# Patient Record
Sex: FEMALE | Race: BLACK OR AFRICAN AMERICAN | Employment: OTHER | URBAN - METROPOLITAN AREA
[De-identification: names, ages, dates, MRNs, and addresses within clinical notes are randomized per-mention and may not be internally consistent; named-entity substitution may affect disease eponyms.]

---

## 2018-09-19 ENCOUNTER — HOSPITAL ENCOUNTER (INPATIENT)
Dept: INPATIENT UNIT | Age: 78
LOS: 3 days | Discharge: NURSING, REHAB OR LONG-TERM CARE | DRG: 091 | End: 2018-09-22
Attending: EMERGENCY MEDICINE | Admitting: FAMILY MEDICINE
Payer: COMMERCIAL

## 2018-09-19 DIAGNOSIS — R47.89 WORD FINDING DIFFICULTY: Primary | ICD-10-CM

## 2018-09-19 DIAGNOSIS — Z99.2 ESRD ON HEMODIALYSIS (HCC): ICD-10-CM

## 2018-09-19 DIAGNOSIS — G93.40 ACUTE ENCEPHALOPATHY: ICD-10-CM

## 2018-09-19 DIAGNOSIS — N18.6 ESRD ON HEMODIALYSIS (HCC): ICD-10-CM

## 2018-09-19 DIAGNOSIS — J81.0 ACUTE PULMONARY EDEMA (HCC): ICD-10-CM

## 2018-09-19 DIAGNOSIS — I10 HYPERTENSION, UNSPECIFIED TYPE: ICD-10-CM

## 2018-09-19 PROBLEM — R47.01 APHASIA: Status: ACTIVE | Noted: 2018-09-19

## 2018-09-19 LAB
A/G RATIO: 1 (ref 1.1–2.2)
ABO/RH: NORMAL
ALBUMIN SERPL-MCNC: 3.6 G/DL (ref 3.4–5)
ALP BLD-CCNC: 105 U/L (ref 40–129)
ALT SERPL-CCNC: 17 U/L (ref 10–40)
ANION GAP SERPL CALCULATED.3IONS-SCNC: 14 MMOL/L (ref 3–16)
ANTIBODY SCREEN: NORMAL
APTT: 27.6 SEC (ref 26–36)
AST SERPL-CCNC: 24 U/L (ref 15–37)
BASOPHILS ABSOLUTE: 0 K/UL (ref 0–0.2)
BASOPHILS RELATIVE PERCENT: 0.4 %
BILIRUB SERPL-MCNC: 0.7 MG/DL (ref 0–1)
BUN BLDV-MCNC: 31 MG/DL (ref 7–20)
CALCIUM SERPL-MCNC: 8.9 MG/DL (ref 8.3–10.6)
CHLORIDE BLD-SCNC: 91 MMOL/L (ref 99–110)
CO2: 32 MMOL/L (ref 21–32)
CREAT SERPL-MCNC: 3.7 MG/DL (ref 0.6–1.2)
EOSINOPHILS ABSOLUTE: 0.1 K/UL (ref 0–0.6)
EOSINOPHILS RELATIVE PERCENT: 1.1 %
GFR AFRICAN AMERICAN: 14
GFR NON-AFRICAN AMERICAN: 12
GLOBULIN: 3.7 G/DL
GLUCOSE BLD-MCNC: 110 MG/DL (ref 70–99)
GLUCOSE BLD-MCNC: 128 MG/DL (ref 70–99)
HCT VFR BLD CALC: 38.9 % (ref 36–48)
HEMOGLOBIN: 12.6 G/DL (ref 12–16)
INR BLD: 1.04 (ref 0.86–1.14)
LYMPHOCYTES ABSOLUTE: 1.7 K/UL (ref 1–5.1)
LYMPHOCYTES RELATIVE PERCENT: 23.9 %
MCH RBC QN AUTO: 26.6 PG (ref 26–34)
MCHC RBC AUTO-ENTMCNC: 32.4 G/DL (ref 31–36)
MCV RBC AUTO: 82.1 FL (ref 80–100)
MONOCYTES ABSOLUTE: 0.7 K/UL (ref 0–1.3)
MONOCYTES RELATIVE PERCENT: 10 %
NEUTROPHILS ABSOLUTE: 4.6 K/UL (ref 1.7–7.7)
NEUTROPHILS RELATIVE PERCENT: 64.6 %
PDW BLD-RTO: 16.8 % (ref 12.4–15.4)
PERFORMED ON: ABNORMAL
PLATELET # BLD: 186 K/UL (ref 135–450)
PMV BLD AUTO: 7.9 FL (ref 5–10.5)
POTASSIUM SERPL-SCNC: 4.3 MMOL/L (ref 3.5–5.1)
PROTHROMBIN TIME: 11.8 SEC (ref 9.8–13)
RBC # BLD: 4.74 M/UL (ref 4–5.2)
SODIUM BLD-SCNC: 137 MMOL/L (ref 136–145)
TOTAL PROTEIN: 7.3 G/DL (ref 6.4–8.2)
TROPONIN: 0.06 NG/ML
TROPONIN: 0.07 NG/ML
WBC # BLD: 7.2 K/UL (ref 4–11)

## 2018-09-19 PROCEDURE — 93005 ELECTROCARDIOGRAM TRACING: CPT

## 2018-09-19 PROCEDURE — 9990 CHARGE CONVERSION

## 2018-09-19 PROCEDURE — 85730 THROMBOPLASTIN TIME PARTIAL: CPT

## 2018-09-19 PROCEDURE — 94640 AIRWAY INHALATION TREATMENT: CPT

## 2018-09-19 PROCEDURE — 86900 BLOOD TYPING SEROLOGIC ABO: CPT

## 2018-09-19 PROCEDURE — 85025 COMPLETE CBC W/AUTO DIFF WBC: CPT

## 2018-09-19 PROCEDURE — 99285 EMERGENCY DEPT VISIT HI MDM: CPT

## 2018-09-19 PROCEDURE — 85610 PROTHROMBIN TIME: CPT

## 2018-09-19 PROCEDURE — 86901 BLOOD TYPING SEROLOGIC RH(D): CPT

## 2018-09-19 PROCEDURE — 80053 COMPREHEN METABOLIC PANEL: CPT

## 2018-09-19 PROCEDURE — 93010 ELECTROCARDIOGRAM REPORT: CPT | Performed by: INTERNAL MEDICINE

## 2018-09-19 PROCEDURE — 71045 X-RAY EXAM CHEST 1 VIEW: CPT

## 2018-09-19 PROCEDURE — 86850 RBC ANTIBODY SCREEN: CPT

## 2018-09-19 PROCEDURE — 36415 COLL VENOUS BLD VENIPUNCTURE: CPT

## 2018-09-19 PROCEDURE — 84484 ASSAY OF TROPONIN QUANT: CPT

## 2018-09-19 PROCEDURE — 94664 DEMO&/EVAL PT USE INHALER: CPT

## 2018-09-19 PROCEDURE — 96374 THER/PROPH/DIAG INJ IV PUSH: CPT

## 2018-09-19 PROCEDURE — 70450 CT HEAD/BRAIN W/O DYE: CPT

## 2018-09-19 RX ORDER — IPRATROPIUM BROMIDE AND ALBUTEROL SULFATE 2.5; .5 MG/3ML; MG/3ML
1 SOLUTION RESPIRATORY (INHALATION) 4 TIMES DAILY
Status: DISCONTINUED | OUTPATIENT
Start: 2018-09-20 | End: 2018-09-22 | Stop reason: HOSPADM

## 2018-09-19 RX ORDER — CHOLECALCIFEROL (VITAMIN D3) 10 MCG
1 TABLET ORAL
COMMUNITY

## 2018-09-19 RX ORDER — BACLOFEN 10 MG/1
10 TABLET ORAL 3 TIMES DAILY PRN
Status: DISCONTINUED | OUTPATIENT
Start: 2018-09-19 | End: 2018-09-22 | Stop reason: HOSPADM

## 2018-09-19 RX ORDER — PROMETHAZINE HYDROCHLORIDE 25 MG/1
12.5 TABLET ORAL EVERY 6 HOURS PRN
Status: DISCONTINUED | OUTPATIENT
Start: 2018-09-19 | End: 2018-09-22 | Stop reason: HOSPADM

## 2018-09-19 RX ORDER — ACETAMINOPHEN 325 MG/1
650 TABLET ORAL EVERY 6 HOURS PRN
Status: DISCONTINUED | OUTPATIENT
Start: 2018-09-19 | End: 2018-09-22 | Stop reason: HOSPADM

## 2018-09-19 RX ORDER — ASPIRIN 81 MG/1
81 TABLET ORAL DAILY
Status: DISCONTINUED | OUTPATIENT
Start: 2018-09-20 | End: 2018-09-22 | Stop reason: HOSPADM

## 2018-09-19 RX ORDER — IPRATROPIUM BROMIDE AND ALBUTEROL SULFATE 2.5; .5 MG/3ML; MG/3ML
1 SOLUTION RESPIRATORY (INHALATION) EVERY 4 HOURS PRN
Status: DISCONTINUED | OUTPATIENT
Start: 2018-09-19 | End: 2018-09-22 | Stop reason: HOSPADM

## 2018-09-19 RX ORDER — HYDRALAZINE HYDROCHLORIDE 20 MG/ML
5 INJECTION INTRAMUSCULAR; INTRAVENOUS EVERY 6 HOURS PRN
Status: DISCONTINUED | OUTPATIENT
Start: 2018-09-19 | End: 2018-09-22 | Stop reason: HOSPADM

## 2018-09-19 RX ORDER — OMEPRAZOLE 20 MG/1
20 CAPSULE, DELAYED RELEASE ORAL DAILY
COMMUNITY

## 2018-09-19 RX ORDER — CARVEDILOL 6.25 MG/1
6.25 TABLET ORAL 2 TIMES DAILY WITH MEALS
Status: DISCONTINUED | OUTPATIENT
Start: 2018-09-19 | End: 2018-09-22 | Stop reason: HOSPADM

## 2018-09-19 RX ORDER — TIZANIDINE 4 MG/1
4 TABLET ORAL EVERY 12 HOURS PRN
Status: ON HOLD | COMMUNITY
End: 2018-10-04 | Stop reason: HOSPADM

## 2018-09-19 RX ORDER — LORATADINE 10 MG/1
10 TABLET ORAL DAILY
Status: ON HOLD | COMMUNITY
End: 2018-10-04 | Stop reason: HOSPADM

## 2018-09-19 RX ORDER — DILTIAZEM HYDROCHLORIDE 60 MG/1
60 TABLET, FILM COATED ORAL SEE ADMIN INSTRUCTIONS
COMMUNITY
End: 2019-02-11 | Stop reason: ALTCHOICE

## 2018-09-19 RX ORDER — PANTOPRAZOLE SODIUM 40 MG/1
40 TABLET, DELAYED RELEASE ORAL
Status: DISCONTINUED | OUTPATIENT
Start: 2018-09-20 | End: 2018-09-22 | Stop reason: HOSPADM

## 2018-09-19 RX ORDER — HYDRALAZINE HYDROCHLORIDE 20 MG/ML
5 INJECTION INTRAMUSCULAR; INTRAVENOUS EVERY 4 HOURS PRN
Status: DISCONTINUED | OUTPATIENT
Start: 2018-09-19 | End: 2018-09-19

## 2018-09-19 RX ORDER — LOSARTAN POTASSIUM 50 MG/1
50 TABLET ORAL DAILY
Status: ON HOLD | COMMUNITY
End: 2018-09-22 | Stop reason: HOSPADM

## 2018-09-19 RX ORDER — IPRATROPIUM BROMIDE AND ALBUTEROL SULFATE 2.5; .5 MG/3ML; MG/3ML
1 SOLUTION RESPIRATORY (INHALATION) EVERY 4 HOURS
Status: DISCONTINUED | OUTPATIENT
Start: 2018-09-19 | End: 2018-09-19

## 2018-09-19 RX ORDER — MONTELUKAST SODIUM 10 MG/1
10 TABLET ORAL NIGHTLY
Status: DISCONTINUED | OUTPATIENT
Start: 2018-09-19 | End: 2018-09-22 | Stop reason: HOSPADM

## 2018-09-19 RX ORDER — SODIUM CHLORIDE 0.9 % (FLUSH) 0.9 %
10 SYRINGE (ML) INJECTION EVERY 12 HOURS SCHEDULED
Status: DISCONTINUED | OUTPATIENT
Start: 2018-09-19 | End: 2018-09-22 | Stop reason: HOSPADM

## 2018-09-19 RX ORDER — MIDODRINE HYDROCHLORIDE 5 MG/1
10 TABLET ORAL
Status: DISCONTINUED | OUTPATIENT
Start: 2018-09-21 | End: 2018-09-21

## 2018-09-19 RX ORDER — CARVEDILOL 6.25 MG/1
6.25 TABLET ORAL 2 TIMES DAILY WITH MEALS
COMMUNITY

## 2018-09-19 RX ORDER — ATORVASTATIN CALCIUM 20 MG/1
20 TABLET, FILM COATED ORAL
COMMUNITY

## 2018-09-19 RX ORDER — ONDANSETRON 2 MG/ML
4 INJECTION INTRAMUSCULAR; INTRAVENOUS EVERY 6 HOURS PRN
Status: DISCONTINUED | OUTPATIENT
Start: 2018-09-19 | End: 2018-09-22 | Stop reason: HOSPADM

## 2018-09-19 RX ORDER — FLUTICASONE PROPIONATE 50 MCG
2 SPRAY, SUSPENSION (ML) NASAL DAILY
COMMUNITY

## 2018-09-19 RX ORDER — SODIUM CHLORIDE 0.9 % (FLUSH) 0.9 %
10 SYRINGE (ML) INJECTION PRN
Status: DISCONTINUED | OUTPATIENT
Start: 2018-09-19 | End: 2018-09-22 | Stop reason: HOSPADM

## 2018-09-19 RX ORDER — LEVETIRACETAM 500 MG/1
500 TABLET ORAL 2 TIMES DAILY
Status: DISCONTINUED | OUTPATIENT
Start: 2018-09-19 | End: 2018-09-22 | Stop reason: HOSPADM

## 2018-09-19 RX ORDER — ATORVASTATIN CALCIUM 20 MG/1
20 TABLET, FILM COATED ORAL
Status: DISCONTINUED | OUTPATIENT
Start: 2018-09-19 | End: 2018-09-22 | Stop reason: HOSPADM

## 2018-09-19 RX ORDER — DILTIAZEM HYDROCHLORIDE 60 MG/1
60 TABLET, FILM COATED ORAL
Status: DISCONTINUED | OUTPATIENT
Start: 2018-09-21 | End: 2018-09-22 | Stop reason: HOSPADM

## 2018-09-19 RX ORDER — BENZONATATE 100 MG/1
100 CAPSULE ORAL EVERY 4 HOURS PRN
Status: ON HOLD | COMMUNITY
End: 2018-10-04 | Stop reason: HOSPADM

## 2018-09-19 RX ORDER — ASPIRIN 81 MG/1
81 TABLET ORAL DAILY
Status: DISCONTINUED | OUTPATIENT
Start: 2018-09-19 | End: 2018-09-19

## 2018-09-19 RX ORDER — BACLOFEN 10 MG/1
10 TABLET ORAL 3 TIMES DAILY PRN
Status: ON HOLD | COMMUNITY
End: 2018-10-04 | Stop reason: HOSPADM

## 2018-09-19 RX ADMIN — LEVETIRACETAM 500 MG: 500 TABLET ORAL at 20:37

## 2018-09-19 RX ADMIN — Medication 10 ML: at 20:38

## 2018-09-19 RX ADMIN — CARVEDILOL 6.25 MG: 6.25 TABLET, FILM COATED ORAL at 20:37

## 2018-09-19 RX ADMIN — ATORVASTATIN CALCIUM 20 MG: 20 TABLET, FILM COATED ORAL at 20:38

## 2018-09-19 RX ADMIN — IPRATROPIUM BROMIDE AND ALBUTEROL SULFATE 3 ML: 2.5; .5 SOLUTION RESPIRATORY (INHALATION) at 20:24

## 2018-09-19 RX ADMIN — MONTELUKAST SODIUM 10 MG: 10 TABLET ORAL at 20:37

## 2018-09-19 RX ADMIN — HYDRALAZINE HYDROCHLORIDE 5 MG: 20 INJECTION INTRAMUSCULAR; INTRAVENOUS at 17:26

## 2018-09-19 ASSESSMENT — ENCOUNTER SYMPTOMS
STRIDOR: 0
NAUSEA: 0
SHORTNESS OF BREATH: 0
BACK PAIN: 0
CONSTIPATION: 0
ABDOMINAL DISTENTION: 0
ABDOMINAL PAIN: 0
COLOR CHANGE: 0
DIARRHEA: 0
WHEEZING: 0
VOMITING: 0
COUGH: 0

## 2018-09-19 ASSESSMENT — PAIN SCALES - GENERAL
PAINLEVEL_OUTOF10: 0

## 2018-09-19 NOTE — ED PROVIDER NOTES
Because of this the patient would benefit from further evaluation but she is not a lytic candidate. Critical impression 1 dysarthria 2.   CVA     Liza Louise MD  09/19/18 4305

## 2018-09-19 NOTE — ED NOTES
Pharmacy Medication History Note      List of current medications patient is taking is complete. Source of information: MAR form Home at [facility], called nurse for last dose times. Changes made to medication list:  Medications flagged for removal (include reason, ex. noncompliance):  · N/A    Medications removed (include reason, ex. therapy complete or physician discontinued):  · Humalog- dose adjustment  · Methocarbamol- therapy complete    Medications added/doses adjusted:  · Aspirin 81mg- QD  · Baclofen 10- TID prn  · Carvedilol 6.25mg- BID  · Losartan 50mg- QD  · Diltiazem 60mg- BID three days a week  · Loratadine 10mg- QD  · Midodrine 10mg- three times a week  · Novolog- sliding scale    Other notes (ex. Recent course of antibiotics, Coumadin dosing):  · Denies use of other OTC or herbal medications. Last dose times updated.    Samia

## 2018-09-19 NOTE — ED PROVIDER NOTES
Palsy (4. ): Normal  Motor Arm, Left (5a. ): No drift  Motor Arm, Right (5b. ): No drift  Motor Leg, Left (6a. ): No drift  Motor Leg, Right (6b. ): No drift  Limb Ataxia (7. ): Absent  Sensory (8. ): Normal  Best Language (9. ): No aphasia  Dysarthria (10. ): Normal  Extinction and Inattention (11): No neglect  Total: 0         PHYSICAL EXAM    (up to 7 for level 4, 8 or more for level 5)     ED Triage Vitals   BP Temp Temp Source Pulse Resp SpO2 Height Weight   09/19/18 1239 09/19/18 1234 09/19/18 1234 09/19/18 1234 09/19/18 1234 09/19/18 1234 -- 09/19/18 1234   (!) 179/63 97 °F (36.1 °C) Oral 78 17 97 %  10 lb (4.536 kg)       Physical Exam   Constitutional: She is oriented to person, place, and time. She appears well-developed and well-nourished. No distress. HENT:   Head: Normocephalic and atraumatic. Right Ear: External ear normal.   Left Ear: External ear normal.   Nose: Nose normal.   Mouth/Throat: Oropharynx is clear and moist.   Eyes: Pupils are equal, round, and reactive to light. Conjunctivae and EOM are normal. Right eye exhibits no discharge. Left eye exhibits no discharge. No scleral icterus. Neck: Normal range of motion. No JVD present. No Brudzinski's sign and no Kernig's sign noted. Cardiovascular: Normal rate. Pulmonary/Chest: Effort normal and breath sounds normal. No stridor. Abdominal: Soft. Bowel sounds are normal. She exhibits no abdominal bruit and no pulsatile midline mass. There is no tenderness. There is no CVA tenderness. Musculoskeletal: Normal range of motion. Lymphadenopathy:     She has no cervical adenopathy. Neurological: She is alert and oriented to person, place, and time. She has normal strength. No cranial nerve deficit (II-XII Intact) or sensory deficit. GCS eye subscore is 4. GCS verbal subscore is 5. GCS motor subscore is 6.    Mild word finding difficulty  5 out of 5 strength in all 4 extremities without focal weakness, paresthesia or radiculopathy   No Laboratory  [address], [address]   Phone [phone]   TYPE AND SCREEN       All other labs were within normal range or not returned as of this dictation. EKG: All EKG's are interpreted by the Emergency Department Physician who either signs or Co-signs this chart in the absence of a cardiologist.  Please see their note for interpretation of EKG. RADIOLOGY:   Non-plain film images such as CT, Ultrasound and MRI are read by the radiologist. Plain radiographic images are visualized and preliminarily interpreted by the  ED Provider with the below findings:        Interpretation per the Radiologist below, if available at the time of this note:    XR CHEST PORTABLE   Final Result   Mild CHF with asymmetric right-sided edema and/or pneumonia. CT Head WO Contrast   Final Result   No acute intracranial abnormality. PROCEDURES   Unless otherwise noted below, none     Procedures    CRITICAL CARE TIME   N/A    CONSULTS:  Tomasa Gallo HOSPITALIST  Dr Lg Henry will admit     EMERGENCY DEPARTMENT COURSE and DIFFERENTIAL DIAGNOSIS/MDM:   Vitals:    Vitals:    09/19/18 1239 09/19/18 1245 09/19/18 1330 09/19/18 1345   BP: (!) 179/63 (!) 188/65 (!) 182/86 (!) 186/75   Pulse:  71 79 79   Resp:  18 17 16   Temp:       TempSrc:       SpO2:  97% 95% 94%   Weight:           Patient was given the following medications:  Medications - No data to display    This patient presents to the emergency department with some word finding difficulty and confusion. She has no chest pain or shortness of breath. It is unclear what is causing her acute encephalopathy. We did consider potential stroke however no other neuro deficits are appreciated on my exam.  She told my attending that she started being a little confused last night.   My suspicion is low for ICH, epidural or subdural hematoma, subarachnoid hemorrhage,ACS, PE, myocarditis, pericarditis, pneumothorax,   thoracic aortic dissection,

## 2018-09-20 LAB
CHOLESTEROL, TOTAL: 163 MG/DL (ref 0–199)
HDLC SERPL-MCNC: 49 MG/DL (ref 40–60)
LDL CHOLESTEROL CALCULATED: 93 MG/DL
LEFT VENTRICULAR EJECTION FRACTION HIGH VALUE: 50 %
LEFT VENTRICULAR EJECTION FRACTION MODE: NORMAL
LV EF: 45 %
LV EF: 48 %
LVEF MODALITY: NORMAL
TRIGL SERPL-MCNC: 106 MG/DL (ref 0–150)
TROPONIN: 0.07 NG/ML
VLDLC SERPL CALC-MCNC: 21 MG/DL

## 2018-09-20 PROCEDURE — G8979 MOBILITY GOAL STATUS: HCPCS

## 2018-09-20 PROCEDURE — 92523 SPEECH SOUND LANG COMPREHEN: CPT

## 2018-09-20 PROCEDURE — G8997 SWALLOW GOAL STATUS: HCPCS

## 2018-09-20 PROCEDURE — 70551 MRI BRAIN STEM W/O DYE: CPT

## 2018-09-20 PROCEDURE — 92610 EVALUATE SWALLOWING FUNCTION: CPT

## 2018-09-20 PROCEDURE — 97162 PT EVAL MOD COMPLEX 30 MIN: CPT

## 2018-09-20 PROCEDURE — 80061 LIPID PANEL: CPT

## 2018-09-20 PROCEDURE — G8996 SWALLOW CURRENT STATUS: HCPCS

## 2018-09-20 PROCEDURE — 93306 TTE W/DOPPLER COMPLETE: CPT

## 2018-09-20 PROCEDURE — G8987 SELF CARE CURRENT STATUS: HCPCS

## 2018-09-20 PROCEDURE — 36415 COLL VENOUS BLD VENIPUNCTURE: CPT

## 2018-09-20 PROCEDURE — 99223 1ST HOSP IP/OBS HIGH 75: CPT | Performed by: PSYCHIATRY & NEUROLOGY

## 2018-09-20 PROCEDURE — 84484 ASSAY OF TROPONIN QUANT: CPT

## 2018-09-20 PROCEDURE — 9990 CHARGE CONVERSION

## 2018-09-20 PROCEDURE — G8989 SELF CARE D/C STATUS: HCPCS

## 2018-09-20 PROCEDURE — 97530 THERAPEUTIC ACTIVITIES: CPT

## 2018-09-20 PROCEDURE — 97165 OT EVAL LOW COMPLEX 30 MIN: CPT

## 2018-09-20 PROCEDURE — 97116 GAIT TRAINING THERAPY: CPT

## 2018-09-20 PROCEDURE — 94640 AIRWAY INHALATION TREATMENT: CPT

## 2018-09-20 PROCEDURE — 94760 N-INVAS EAR/PLS OXIMETRY 1: CPT

## 2018-09-20 PROCEDURE — G8988 SELF CARE GOAL STATUS: HCPCS

## 2018-09-20 PROCEDURE — G8978 MOBILITY CURRENT STATUS: HCPCS

## 2018-09-20 PROCEDURE — 92526 ORAL FUNCTION THERAPY: CPT

## 2018-09-20 RX ADMIN — CARVEDILOL 6.25 MG: 6.25 TABLET, FILM COATED ORAL at 08:52

## 2018-09-20 RX ADMIN — IPRATROPIUM BROMIDE AND ALBUTEROL SULFATE 3 ML: .5; 3 SOLUTION RESPIRATORY (INHALATION) at 20:41

## 2018-09-20 RX ADMIN — MONTELUKAST SODIUM 10 MG: 10 TABLET ORAL at 21:50

## 2018-09-20 RX ADMIN — ASPIRIN 81 MG: 81 TABLET, COATED ORAL at 08:52

## 2018-09-20 RX ADMIN — Medication 10 ML: at 08:56

## 2018-09-20 RX ADMIN — CARVEDILOL 6.25 MG: 6.25 TABLET, FILM COATED ORAL at 18:37

## 2018-09-20 RX ADMIN — LEVETIRACETAM 500 MG: 500 TABLET ORAL at 21:50

## 2018-09-20 RX ADMIN — PANTOPRAZOLE SODIUM 40 MG: 40 TABLET, DELAYED RELEASE ORAL at 08:52

## 2018-09-20 RX ADMIN — IPRATROPIUM BROMIDE AND ALBUTEROL SULFATE 3 ML: .5; 3 SOLUTION RESPIRATORY (INHALATION) at 08:15

## 2018-09-20 RX ADMIN — LEVETIRACETAM 500 MG: 500 TABLET ORAL at 08:52

## 2018-09-20 RX ADMIN — IPRATROPIUM BROMIDE AND ALBUTEROL SULFATE 3 ML: .5; 3 SOLUTION RESPIRATORY (INHALATION) at 15:58

## 2018-09-20 RX ADMIN — Medication 10 ML: at 21:50

## 2018-09-20 RX ADMIN — IPRATROPIUM BROMIDE AND ALBUTEROL SULFATE 3 ML: .5; 3 SOLUTION RESPIRATORY (INHALATION) at 12:16

## 2018-09-20 ASSESSMENT — PAIN SCALES - GENERAL
PAINLEVEL_OUTOF10: 0
PAINLEVEL_OUTOF10: 0
PAINLEVEL_OUTOF10: 1
PAINLEVEL_OUTOF10: 1
PAINLEVEL_OUTOF10: 0

## 2018-09-20 NOTE — CONSULTS
NEUROLOGY CONSULTATION     Patient's Name :   Kelsi Juarez        YOB: 1940                    Date of Consultation : 9/20/2018 4:17 PM    Referring Physician :  Alycai Whaley MD    Reason for Consultation :    ?  Stroke versus TIA    HISTORY OF PRESENT ILLNESS :    Pilo Guzman is a 66 y.o. female   History was obtained from patient and from dictations in the chart. Patient has end-stage renal disease on hemodialysis. She states that she was late for her dialysis which is very unusual and then became somewhat confused. She states that she also could not talk very well and could not express herself very well. She was brought to the hospital and admitted for further evaluation symptoms of slowly improved. Patient states that she is almost back to normal now but not completely. She still feels that she has some difficulty with speech at times especially some word finding difficulties. Denies any focal weakness. Has chronic numbness in her arms and legs from neuropathy.   Symptom onset was fairly acute and abrupt and moderately severe without any other aggravating or relieving factors      REVIEW OF SYSTEMS  Denies any chest pain palpitations breathlessness abdominal pain fever or weight loss  Rest of the 14 system review was reviewed and was negative except for the symptoms noted above    Past Medical History:   Diagnosis Date    [facility]fib ([facility] [address]     Asthma     DJD (degenerative joint disease) of knee 6/16/2010    DM (diabetes mellitus) ([facility] [address] 6/15/2010    ESRD (end stage renal disease) ([facility] [address]     HD Mon/Wed/Fri    Hemodialysis patient ([facility] [address]     HTN (hypertension) 6/15/2010    Hyperlipidemia     Pneumonia     Seizure disorder (HCC)     Type II or unspecified type diabetes mellitus without mention of complication, not stated as uncontrolled      Family History   Problem Relation Age of Onset    Diabetes Other     Hypertension Other     Coronary Art Dis Other     Stroke Other     Breast Cancer Other      Social History     Social History    Marital status:       Spouse name: N/A    Number of children: 4    Years of education: 15     Social History Main Topics    Smoking status: Never Smoker    Smokeless tobacco: Never Used    Alcohol use No    Drug use: No    Sexual activity: No     Other Topics Concern    None     Social History Narrative    None     Current Facility-Administered Medications   Medication Dose Route Frequency Provider Last Rate Last Dose    aspirin EC tablet 81 mg  81 mg Oral Daily Jere Ormond, MD   81 mg at 09/20/18 0852    atorvastatin (LIPITOR) tablet 20 mg  20 mg Oral Once per day on Mon Wed Fri Jere Ormond, MD   20 mg at 09/19/18 2038    acetaminophen (TYLENOL) tablet 650 mg  650 mg Oral Q6H PRN Jere Ormond, MD        baclofen (LIORESAL) tablet 10 mg  10 mg Oral TID PRN Jere Ormond, MD        carvedilol (COREG) tablet 6.25 mg  6.25 mg Oral BID  Jere Ormond, MD   6.25 mg at 09/20/18 0852    [START ON 9/21/2018] diltiazem (CARDIZEM) tablet 60 mg  60 mg Oral 2 times per day on Mon Wed Fri Jere Ormond, MD        levETIRAcetam (KEPPRA) tablet 500 mg  500 mg Oral BID Jere Ormond, MD   500 mg at 09/20/18 0852    [START ON 9/21/2018] midodrine (PROAMATINE) tablet 10 mg  10 mg Oral Once per day on Mon Wed Fri Jere Ormond, MD        montelukast (SINGULAIR) tablet 10 mg  10 mg Oral Nightly Jere Ormond, MD   10 mg at 09/19/18 2037    pantoprazole (PROTONIX) tablet 40 mg  40 mg Oral QAM AC Jere Ormond, MD   40 mg at 09/20/18 0852    promethazine (PHENERGAN) tablet 12.5 mg  12.5 mg Oral Q6H PRN Jere Ormond, MD        sodium chloride flush 0.9 % injection 10 mL  10 mL Intravenous 2 times per day Jere Ormond, MD   10 mL at 09/20/18 0856    sodium chloride flush 0.9 % injection 10 mL  10 mL Intravenous PRN Jere Ormond, MD        magnesium hydroxide (MILK OF MAGNESIA) 400 Keppra  Continue aspirin  I will get carotid Doppler study  Thank you for this consultation        Please note a portion of this chart was generated using dragon dictation software. Although every effort was made to ensure the accuracy of this automated transcription, some errors in transcription may have occurred.

## 2018-09-20 NOTE — PROGRESS NOTES
intubated patients on mechanical ventilation. 6. Inhalation medication orders will be delivered and/or substituted as outlined below. Aerosolized Medications Ordering and Administration Guidelines:    1. All Medications will be ordered by a physician, and their frequency and/or modality will be adjusted as defined by the patients Respiratory Severity Index (RSI) score. 2. If the patient does not have documented COPD, consider discontinuing anticholinergics when RSI is less than 9.  3. If the bronchospasm worsens (increased RSI), then the bronchodilator frequency can be increased to a maximum of every 4 hours. If greater than every 4 hours is required, the physician will be contacted. 4. If the bronchospasm improves, the frequency of the bronchodilator can be decreased, based on the patient's RSI, but not less than home treatment regimen frequency. 5. Bronchodilator(s) will be discontinued if patient has a RSI less than 9 and has received no scheduled or as needed treatment for 72  Hrs. Patients Ordered on a Mucolytic Agent:    1. Must always be administered with a bronchodilator. 2. Discontinue if patient experiences worsened bronchospasm, or secretions have lessened to the point that the patient is able to clear them with a cough. Anti-inflammatory and Combination Medications:    1. If the patient lacks prior history of lung disease, is not using inhaled anti-inflammatory medication at home, and lacks wheezing by examination or by history for at least 24 hours, contact physician for possible discontinuation.

## 2018-09-20 NOTE — CARE COORDINATION
Discharge planning-    Patient is noted to be from Home at [facility]. Called Home at [facility], left message with Raudel Rascon in admissions. Requested return call re: to confirm which level of care the patient is from (long-term care vs short-term rehab). May need a pre cert to return (PT/OT pending). Facesheet sent via fax. Addendum: Spoke with [address] at [facility]. Patient is from long-term care. Patient would need a pre cert only if there is a skilled need- usually, patient does not return to the SNF with any skilled needs. Will await PT/OT evals. Plan- From Home at [facility] SNF long-term care. Will need pre cert to return only if there is a skilled need.     Will continue to follow for support and discharge planning.    -Chinmay Sheridan, MSW, LSW

## 2018-09-20 NOTE — PROGRESS NOTES
Occupational Therapy   Occupational Therapy Initial Assessment/Discharge Summary  Date: 2018   Patient Name: Tiara Price  MRN: [id number]     : 1940    Date of Service: 2018    Discharge Recommendations: Tiara Price scored a 23/24 on the [facility] ADL Inpatient form. At this time, no further OT is recommended upon discharge due to pt functioning at or near baseline level for daily occupations. Recommend patient returns to prior setting with prior services (supervision from aide for showers, assist for medications and all other IADLs from [facility] and family). OT Equipment Recommendations  Equipment Needed: No      Patient Diagnosis(es): The primary encounter diagnosis was Word finding difficulty. Diagnoses of Acute encephalopathy, Hypertension, unspecified type, ESRD on hemodialysis ([facility] [address] and Acute pulmonary edema ([facility] [address] were also pertinent to this visit. has a past medical history of A-fib ([address] Asthma; DJD (degenerative joint disease) of knee; DM (diabetes mellitus) ([address] ESRD (end stage renal disease) ([address] Hemodialysis patient ([address] HTN (hypertension); Hyperlipidemia; Pneumonia; Seizure disorder ([address] and Type II or unspecified type diabetes mellitus without mention of complication, not stated as uncontrolled. has a past surgical history that includes Cholecystectomy; Knee Arthroplasty; Hysterectomy; and Cataract removal with implant (Left, ). Restrictions  Restrictions/Precautions  Restrictions/Precautions: Fall Risk (HIGH)  Required Braces or Orthoses?: No  Position Activity Restriction  Other position/activity restrictions: Tiraa Price is a 66 y.o. female with history of atrial fibrillation, diabetes, end-stage renal disease on dialysis , hypertension, seizure disorder who presents to the emergency departments with confusion. She states that she has felt confused ever since she woke up this morning.   She states that last night before bed, she felt fine. She is having some word finding difficulties but otherwise denies any visual disturbance, dizziness, lightheadedness, headache, chest pain, shortness of breath, abdominal pain, nausea, vomiting. She was at dialysis and became very hypertensive and more confused and so they sent her into the ED for further evaluation. Subjective   General  Chart Reviewed: Yes  Patient assessed for rehabilitation services?: Yes  Family / Caregiver Present: Yes (son)  Diagnosis: aphasia  Subjective  Subjective: Pt seated in recliner upon arrival, agreeable to evaluation. Pain Assessment  Patient Currently in Pain: Denies  Pain Assessment: 0-10       Social/Functional History  Social/Functional History  Type of Home: Facility ([facility])  Home Layout: One level  Bathroom Shower/Tub: Walk-in shower  Bathroom Toilet: Standard  Bathroom Equipment:  (pull cords in bathroom)  Bathroom Accessibility: Wheelchair accessible  Home Equipment: Wheelchair-manual, 4 wheeled walker, Oxygen (\"oxygen if I need it, I have only used it one time\")  ADL Assistance: Needs assistance  Bath:  (aide assists)  Dressing: Independent  Toileting: Independent (mod I - with rollator or w/c)  Ambulation Assistance: Independent (reports using w/c without leg rests and pulling with legs for main mode of mobility; also uses rollator with supervision for short trips.)  Transfer Assistance: Independent (reports using arm rests for transfers)  Leisure & Hobbies: gardening, cooking, reading  IADL Comments: Nurses help with medications. Meals are brought to her room daily. Family helps with bills. 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 Reports having been at [facility] for 3-4 years. Reports hx of 2 falls in the past few years; but none in the past 6 months.        Objective   Vision: Impaired  Hearing: Within functional limits    Orientation  Overall

## 2018-09-20 NOTE — PROGRESS NOTES
1-2x to ensure diet tolerance   ST.) Pt will tolerate recommended diet without s/s of aspiration     SPEECH LANGUAGE EVALUATION:  Speech Language Treatment Diagnosis:  Minimal Expressive Aphasia     Speech Language Impressions: Pt was awake and alert. Oral motor exam was [facility] with no dysarthria assessed. Verbal expression was characterized by intermittent impaired/delayed word finding and reduced thought organization. Auditory comprehension was mildly delayed for complex commands. Pt was oriented to all aspects. Long term memory appeared intact. Oral Motor exam/Motor Speech:  -No dysarthria      Verbal Expression:   -Confrontational naming with 95% accuracy   -Picture description with 85% accuracy   -Convergent naming with 75% accuracy, min cues   -Divergent naming with 25% accuracy   -Able to participate in conversation with intermittent impaired word finding (i.e., pt was unable to recall name of place she lives)    Auditory Comprehension:   -Appeared grossly WFL for questions/commands and at the conversational level    -Delayed processing for complex commands     Cognitive-Linguistic:   -Oriented to all aspects   -Unable to recall current living location   -Long term memory appeared intact   -Problem solving appeared intact     Plan: Speech therapy 3-5 times a weeks for speech-language treatment, and dysphagia treatment     Discharge Recommendations:  Pt will benefit from continued skilled Speech Therapy for Speech and Dysphagia services, prior to returning home. Prior Status: Resides at [facility]. Prior hx of CVA     Speech Language Short-term goals: 1. Pt will improve verbal expression via graded tasks to 100% min cues    2. Pt will improve short term recall via graded tasks to 80%    Patient/Family Education:Education, results and recommendations given to the Pt and nurse, who verbalized understanding    SLP G-Codes  Functional Limitations: Swallowing  Swallow Current Status ():  At least 20 percent but less than 40 percent impaired, limited or restricted  Swallow Goal Status ():  At least 1 percent but less than 20 percent impaired, limited or restricted     Timed Code Treatment:  0 minutes     Total Treatment Time: 39 minutes      Kelly Alas [address]  Speech Language Pathologist

## 2018-09-20 NOTE — PLAN OF CARE
Problem: HEMODYNAMIC STATUS  Goal: Patient has stable vital signs and fluid balance  Outcome: Ongoing  Keep  or less. Pulse rate and rhythm, peripheral pulses, and capillary refill assessed every shift with assessment. General color and body temperature monitored throughout shift and with vitals. Assess for edema with head to toe assessment. Administer treatments and medications as ordered. Monitor patient's weight. Problem: ACTIVITY INTOLERANCE/IMPAIRED MOBILITY  Goal: Mobility/activity is maintained at optimum level for patient  Outcome: Ongoing  No deficits noted at this time. Monitor Q4 and PRN. Problem: COMMUNICATION IMPAIRMENT  Goal: Ability to express needs and understand communication  Outcome: Ongoing  Pt exhibiting expressive aphasia and echolalia. Monitor Q4 and PRN.

## 2018-09-20 NOTE — PLAN OF CARE
Problem: Falls - Risk of:  Goal: Will remain free from falls  Will remain free from falls   Outcome: Ongoing  Pt remains free of falls. Fall risk protocol in place. Bed locked in lowest position. Call light in reach. Pt instructed to call for assistance, verbalizes understanding. Will continue to monitor.   Goal: Absence of physical injury  Absence of physical injury   Outcome: Ongoing

## 2018-09-20 NOTE — PROGRESS NOTES
dizziness, lightheadedness, headache, chest pain, shortness of breath, abdominal pain, nausea, vomiting. She was at dialysis and became very hypertensive and more confused and so they sent her into the ED for further evaluation. Vision/Hearing  Vision: Impaired  Vision Exceptions:  (reports recent change in vision with onset of confusion/difficulty speaking yesterday)  Hearing: Within functional limits     Subjective  General  Chart Reviewed: Yes  Response To Previous Treatment: Not applicable  Family / Caregiver Present: Yes (son)  Follows Commands: Within Functional Limits  Subjective  Subjective: Pt seated in bedside chair upon arrival with son present. Reports that she feels healthy but has been having trouble finding words and remembering things since her dialysis treatment yesterday. Pt did demonstrate some signs of aphasia, but was able to communicate well at this date. Pt agreeable to PT/OT evaluation.    Pain Screening  Patient Currently in Pain: Denies  Vital Signs  Patient Currently in Pain: Denies       Orientation  Orientation  Overall Orientation Status: Within Functional Limits    Social/Functional History  Social/Functional History  Type of Home: Facility ([facility])  Home Layout: One level  Bathroom Shower/Tub: Walk-in shower  Bathroom Toilet: Standard  Bathroom Equipment:  (pull cords in bathroom)  Bathroom Accessibility: Wheelchair accessible  Home Equipment: Wheelchair-manual, 4 wheeled walker, Oxygen (\"oxygen if I need it, I have only used it one time\")  ADL Assistance: Needs assistance  Bath:  (aide assists)  Dressing: Independent  Toileting: Independent (mod I - with rollator or w/c)  Ambulation Assistance: Independent (reports using w/c without leg rests and pulling with legs for main mode of mobility; also uses rollator with SBA/supervision for short trips.)  Transfer Assistance: Independent (reports using arm rests for transfers)  Leisure & Hobbies: gardening, cooking, reading  IADL functional mobility and strength concurrent with baseline level of function. Pt able to complete transfers and ambulation with rollator and SBA; pt receives supervision for mobility at [facility] at baseline. Pt does not require skilled PT services at this time. Prognosis: Excellent  Decision Making: Low Complexity  Patient Education: PT role in acute care, POC, discharge recommendations; amb with nursing staff as desired  No Skilled PT: At baseline function  REQUIRES PT FOLLOW UP: No  Activity Tolerance  Activity Tolerance: Patient Tolerated treatment well  Activity Tolerance: Pt amb with no SOB or dizziness         Plan   Plan  Plan Comment: Pt discharged from PT services due to functional mobility concurrent with baseline PLOF. Safety Devices  Type of devices: All fall risk precautions in place, Call light within reach, Chair alarm in place, Gait belt, Patient at risk for falls, Left in chair, Nurse notified  Restraints  Initially in place: No    G-Code  PT G-Codes  Functional Assessment Tool Used: AM-PAC  Score: 21  Functional Limitation: Mobility: Walking and moving around  Mobility: Walking and Moving Around Current Status (): At least 20 percent but less than 40 percent impaired, limited or restricted  Mobility: Walking and Moving Around Goal Status (): At least 1 percent but less than 20 percent impaired, limited or restricted  OutComes Score    AM-PAC Score  AM-PAC Inpatient Mobility Raw Score : 21  AM-PAC Inpatient T-Scale Score : 50.25  Mobility Inpatient CMS 0-100% Score: 28.97  Mobility Inpatient CMS G-Code Modifier : CJ          Goals   Pt d/c from PT services. Therapy Time   Individual Concurrent Group Co-treatment   Time In 1120         Time Out 1200         Minutes 40         Timed Code Treatment Minutes: [address], SPT       Therapist observed and directed the above evaluation.  Thanks, Greer ClementBasil, [address] [facility], DPT [id number]

## 2018-09-20 NOTE — PLAN OF CARE
Problem: Cardiac Output - Decreased:  Goal: Hemodynamic stability will improve  Hemodynamic stability will improve   Outcome: Ongoing  Pulse rate and rhythm, peripheral pulses, and capillary refill assessed every shift with assessment. General color and body temperature monitored throughout shift and with vitals. Assess for edema with head to toe assessment. Administer treatments and medications as ordered. Monitor patient's weight. Problem: Serum Glucose Level - Abnormal:  Goal: Ability to maintain appropriate glucose levels will improve  Ability to maintain appropriate glucose levels will improve   Outcome: Ongoing      Problem:  Activity:  Goal: Fatigue will decrease  Fatigue will decrease  Outcome: Ongoing    Goal: Risk for activity intolerance will decrease  Risk for activity intolerance will decrease  Outcome: Ongoing      Problem: Coping:  Goal: Ability to cope will improve  Ability to cope will improve  Outcome: Ongoing      Problem: Fluid Volume:  Goal: Will show no signs or symptoms of fluid imbalance  Will show no signs or symptoms of fluid imbalance  Outcome: Ongoing      Problem: Health Behavior:  Goal: Ability to manage health-related needs will improve  Ability to manage health-related needs will improve  Outcome: Ongoing    Goal: Identification of resources available to assist in meeting health care needs will improve  Identification of resources available to assist in meeting health care needs will improve  Outcome: Ongoing      Problem: Nutritional:  Goal: Ability to identify appropriate dietary choices will improve  Ability to identify appropriate dietary choices will improve  Outcome: Ongoing      Problem: Physical Regulation:  Goal: Ability to maintain clinical measurements within normal limits will improve  Ability to maintain clinical measurements within normal limits will improve  Outcome: Ongoing    Goal: Complications related to the disease process, condition or treatment will be avoided

## 2018-09-21 PROBLEM — N25.0 ROD (RENAL OSTEODYSTROPHY): Chronic | Status: ACTIVE | Noted: 2018-09-21

## 2018-09-21 LAB
ALBUMIN SERPL-MCNC: 3.5 G/DL (ref 3.4–5)
ANION GAP SERPL CALCULATED.3IONS-SCNC: 19 MMOL/L (ref 3–16)
BUN BLDV-MCNC: 66 MG/DL (ref 7–20)
CALCIUM SERPL-MCNC: 8.6 MG/DL (ref 8.3–10.6)
CHLORIDE BLD-SCNC: 92 MMOL/L (ref 99–110)
CO2: 28 MMOL/L (ref 21–32)
CREAT SERPL-MCNC: 7.6 MG/DL (ref 0.6–1.2)
GFR AFRICAN AMERICAN: 6
GFR NON-AFRICAN AMERICAN: 5
GLUCOSE BLD-MCNC: 134 MG/DL (ref 70–99)
HCT VFR BLD CALC: 34.7 % (ref 36–48)
HEMOGLOBIN: 11.3 G/DL (ref 12–16)
HEPATITIS B SURFACE ANTIGEN INTERPRETATION: NORMAL
MCH RBC QN AUTO: 26.2 PG (ref 26–34)
MCHC RBC AUTO-ENTMCNC: 32.6 G/DL (ref 31–36)
MCV RBC AUTO: 80.3 FL (ref 80–100)
PDW BLD-RTO: 16.7 % (ref 12.4–15.4)
PHOSPHORUS: 5.5 MG/DL (ref 2.5–4.9)
PLATELET # BLD: 198 K/UL (ref 135–450)
PMV BLD AUTO: 7.9 FL (ref 5–10.5)
POTASSIUM SERPL-SCNC: 4.2 MMOL/L (ref 3.5–5.1)
RBC # BLD: 4.32 M/UL (ref 4–5.2)
SODIUM BLD-SCNC: 139 MMOL/L (ref 136–145)
WBC # BLD: 5.6 K/UL (ref 4–11)

## 2018-09-21 PROCEDURE — 93880 EXTRACRANIAL BILAT STUDY: CPT

## 2018-09-21 PROCEDURE — 94640 AIRWAY INHALATION TREATMENT: CPT

## 2018-09-21 PROCEDURE — 85027 COMPLETE CBC AUTOMATED: CPT

## 2018-09-21 PROCEDURE — 80069 RENAL FUNCTION PANEL: CPT

## 2018-09-21 PROCEDURE — 99233 SBSQ HOSP IP/OBS HIGH 50: CPT | Performed by: PSYCHIATRY & NEUROLOGY

## 2018-09-21 PROCEDURE — 87340 HEPATITIS B SURFACE AG IA: CPT

## 2018-09-21 PROCEDURE — 5A1D70Z PERFORMANCE OF URINARY FILTRATION, INTERMITTENT, LESS THAN 6 HOURS PER DAY: ICD-10-PCS | Performed by: INTERNAL MEDICINE

## 2018-09-21 PROCEDURE — 86704 HEP B CORE ANTIBODY TOTAL: CPT

## 2018-09-21 PROCEDURE — 9990 CHARGE CONVERSION

## 2018-09-21 PROCEDURE — 94760 N-INVAS EAR/PLS OXIMETRY 1: CPT

## 2018-09-21 RX ORDER — LOSARTAN POTASSIUM 100 MG/1
100 TABLET ORAL NIGHTLY
Status: DISCONTINUED | OUTPATIENT
Start: 2018-09-22 | End: 2018-09-22 | Stop reason: HOSPADM

## 2018-09-21 RX ORDER — LOSARTAN POTASSIUM 25 MG/1
50 TABLET ORAL ONCE
Status: COMPLETED | OUTPATIENT
Start: 2018-09-21 | End: 2018-09-21

## 2018-09-21 RX ORDER — LOSARTAN POTASSIUM 25 MG/1
50 TABLET ORAL DAILY
Status: DISCONTINUED | OUTPATIENT
Start: 2018-09-21 | End: 2018-09-21

## 2018-09-21 RX ADMIN — CARVEDILOL 6.25 MG: 6.25 TABLET, FILM COATED ORAL at 15:17

## 2018-09-21 RX ADMIN — Medication 10 ML: at 21:31

## 2018-09-21 RX ADMIN — LOSARTAN POTASSIUM 50 MG: 25 TABLET ORAL at 15:17

## 2018-09-21 RX ADMIN — MONTELUKAST SODIUM 10 MG: 10 TABLET ORAL at 21:30

## 2018-09-21 RX ADMIN — Medication 10 ML: at 09:32

## 2018-09-21 RX ADMIN — LOSARTAN POTASSIUM 50 MG: 25 TABLET ORAL at 21:30

## 2018-09-21 RX ADMIN — IPRATROPIUM BROMIDE AND ALBUTEROL SULFATE 3 ML: .5; 3 SOLUTION RESPIRATORY (INHALATION) at 21:09

## 2018-09-21 RX ADMIN — PANTOPRAZOLE SODIUM 40 MG: 40 TABLET, DELAYED RELEASE ORAL at 19:32

## 2018-09-21 RX ADMIN — LEVETIRACETAM 500 MG: 500 TABLET ORAL at 21:30

## 2018-09-21 RX ADMIN — ASPIRIN 81 MG: 81 TABLET, COATED ORAL at 19:32

## 2018-09-21 RX ADMIN — IPRATROPIUM BROMIDE AND ALBUTEROL SULFATE 3 ML: .5; 3 SOLUTION RESPIRATORY (INHALATION) at 16:55

## 2018-09-21 RX ADMIN — Medication 0.2 ML: at 09:50

## 2018-09-21 RX ADMIN — ATORVASTATIN CALCIUM 20 MG: 20 TABLET, FILM COATED ORAL at 21:30

## 2018-09-21 ASSESSMENT — PAIN SCALES - GENERAL
PAINLEVEL_OUTOF10: 0
PAINLEVEL_OUTOF10: 10
PAINLEVEL_OUTOF10: 0

## 2018-09-21 NOTE — PROGRESS NOTES
nebulizer solution 3 mL 4x daily   ipratropium-albuterol (DUONEB) nebulizer solution 1 ampule Q4H PRN       Objective:  BP (!) 156/66   Pulse 84   Temp 97.8 °F (36.6 °C) (Temporal)   Resp 16   Ht 4' 11.84\" (1.52 m)   Wt 154 lb 15.7 oz (70.3 kg)   SpO2 100%   BMI 30.43 kg/m²   No intake or output data in the 24 hours ending 09/21/18 0733 Wt Readings from Last 3 Encounters:   09/21/18 154 lb 15.7 oz (70.3 kg)   02/12/18 140 lb (63.5 kg)   01/11/17 151 lb 3.8 oz (68.6 kg)       General appearance:   female,Appears comfortable  Eyes: Sclera clear. Pupils equal.  ENT: Moist oral mucosa. Trachea midline, no adenopathy. Cardiovascular: Regular rhythm, normal S1, S2. No murmur. Respiratory: Not using accessory muscles. Good inspiratory effort. Clear to auscultation bilaterally, no wheeze or crackles. GI: Abdomen soft, no tenderness, not distended, normal bowel sounds  Musculoskeletal: No cyanosis in digits, neck supple  Neurology: CN 2-12 grossly intact. No speech or motor deficits  Psych: Normal affect. Alert and oriented in time, place and person  Skin: Warm, dry, normal turgor  Extremities:No edema in lower extremities, left forearm graft intact.     Labs and Tests:  CBC:   Recent Labs      09/19/18   1250   WBC  7.2   HGB  12.6   PLT  186     BMP:  Recent Labs      09/19/18   1250   NA  137   K  4.3   CL  91*   CO2  32   BUN  31*   CREATININE  3.7*   GLUCOSE  128*     Hepatic: Recent Labs      09/19/18   1250   AST  24   ALT  17   BILITOT  0.7   ALKPHOS  105         Problem List  Active Problems:    Aphasia    Word finding difficulty  Resolved Problems:    * No resolved hospital problems. *       Assessment & Plan:   1.r/o CVA ,  CT of the head is negative for acute findings,  MRI of the brain did not show any new stroke, remote old stroke there   2. Speech and language therapy,  PT/OT consulted. Neurology note reiewed  3. ESRD, Nephrology for Hemodialysis orders . HDdone today   4.  Remote

## 2018-09-21 NOTE — PLAN OF CARE
Problem: Falls - Risk of:  Goal: Will remain free from falls  Will remain free from falls   Outcome: Ongoing  Pt remains free of falls. Fall risk protocol in place. Bed locked in lowest position. Call light in reach. Pt instructed to call for assistance, verbalizes understanding. Will continue to monitor. Goal: Absence of physical injury  Absence of physical injury   Outcome: Ongoing      Problem: HEMODYNAMIC STATUS  Goal: Patient has stable vital signs and fluid balance  Outcome: Completed Date Met: 09/20/18      Problem: ACTIVITY INTOLERANCE/IMPAIRED MOBILITY  Goal: Mobility/activity is maintained at optimum level for patient  Outcome: Completed Date Met: 09/20/18      Problem: COMMUNICATION IMPAIRMENT  Goal: Ability to express needs and understand communication  Outcome: Completed Date Met: 09/20/18      Problem: Cardiac Output - Decreased:  Goal: Hemodynamic stability will improve  Hemodynamic stability will improve   Outcome: Ongoing  Pulse rate and rhythm, peripheral pulses, and capillary refill assessed every shift with assessment. General color and body temperature monitored throughout shift and with vitals. Assess for edema with head to toe assessment. Administer treatments and medications as ordered. Monitor patient's weight.       Problem: Serum Glucose Level - Abnormal:  Goal: Ability to maintain appropriate glucose levels will improve  Ability to maintain appropriate glucose levels will improve   Outcome: Ongoing

## 2018-09-21 NOTE — PROGRESS NOTES
Speech Language Pathology  Attempt   Ann Marie Lamar   1940     Attempted to see pt for dysphagia/speech therapy follow-up. Pt off floor at time of attempt. Will re-attempt as therapy schedule allows.     Thanks,  Tony Grove, [facility]  Speech Language Pathologist

## 2018-09-21 NOTE — PROGRESS NOTES
[facility] PROGRESS NOTE    9/21/2018 10:11 PM        Name: Zoë Núñez . Admitted: 9/19/2018  Primary Care Provider: Referring Not In System (Inactive) (Tel: None)                        Hospital course:  Kylie Dunne a 66 y. o. female who is a nursing home resident has a medical h/o ESRD ( HD on MWF), AFib , DM  Was sent from the Dialysis center d/t acute confusion memory lapse, and intermittent difficulty with word finding . Son at the bedside states the pt is other wise very sharp and remembers all her appointments etc/ Her BP has been high since yesterday. She also c/o new onset headache. She has chronic numbness and tingling in glove and sock fashion    Subjective:  . No acute events overnight. Resting well. Pain control. Diet ok. Labs reviewed  Denies any chest pain sob.      Reviewed interval ancillary notes    Current Medications    lidocaine 1 % injection 0.2 mL PRN   [START ON 9/22/2018] losartan (COZAAR) tablet 100 mg Nightly   aspirin EC tablet 81 mg Daily   atorvastatin (LIPITOR) tablet 20 mg Once per day on Mon Wed Fri   acetaminophen (TYLENOL) tablet 650 mg Q6H PRN   baclofen (LIORESAL) tablet 10 mg TID PRN   carvedilol (COREG) tablet 6.25 mg BID WC   diltiazem (CARDIZEM) tablet 60 mg 2 times per day on Mon Wed Fri   levETIRAcetam (KEPPRA) tablet 500 mg BID   montelukast (SINGULAIR) tablet 10 mg Nightly   pantoprazole (PROTONIX) tablet 40 mg QAM AC   promethazine (PHENERGAN) tablet 12.5 mg Q6H PRN   sodium chloride flush 0.9 % injection 10 mL 2 times per day   sodium chloride flush 0.9 % injection 10 mL PRN   magnesium hydroxide (MILK OF MAGNESIA) 400 MG/5ML suspension 30 mL Daily PRN   ondansetron (ZOFRAN) injection 4 mg Q6H PRN   mometasone-formoterol (DULERA) 200-5 MCG/ACT inhaler 2 puff BID   hydrALAZINE (APRESOLINE) injection 5 mg Q6H PRN hypertensive  Resolved Problems:    * No resolved hospital problems. *       Assessment & Plan:     1.r/o CVA ,  CT of the head is negative for acute findings,  MRI of the brain result normal  2. Speech and language therapy,  PT/OT consulted. Neurology following  3. ESRD, Nephrology for Hemodialysis orders  For today's session   4. Remote history of CVA cont asa and lipitor  5. HTN holding antihypertensives  6. Chronic elevated troponin at baseline 2/2 ESRD pt denies chest pain , will continue to monitor  7.  Afib c/w coreg and cardizem  8.most likely home tomorrow.             Diet: DIET GENERAL;  Code:Full Code  DVT PPX lovenox       Mango Washington MD   9/21/2018 10:11 PM

## 2018-09-22 VITALS
RESPIRATION RATE: 16 BRPM | BODY MASS INDEX: 30.78 KG/M2 | WEIGHT: 156.8 LBS | HEART RATE: 89 BPM | HEIGHT: 60 IN | SYSTOLIC BLOOD PRESSURE: 154 MMHG | OXYGEN SATURATION: 96 % | TEMPERATURE: 97.1 F | DIASTOLIC BLOOD PRESSURE: 76 MMHG

## 2018-09-22 LAB
GLUCOSE BLD-MCNC: 112 MG/DL (ref 70–99)
GLUCOSE BLD-MCNC: 241 MG/DL (ref 70–99)
HEPATITIS B CORE TOTAL ANTIBODY: NEGATIVE
PERFORMED ON: ABNORMAL
PERFORMED ON: ABNORMAL

## 2018-09-22 PROCEDURE — 6370000000 HC RX 637 (ALT 250 FOR IP): Performed by: FAMILY MEDICINE

## 2018-09-22 PROCEDURE — 94640 AIRWAY INHALATION TREATMENT: CPT

## 2018-09-22 PROCEDURE — 94760 N-INVAS EAR/PLS OXIMETRY 1: CPT

## 2018-09-22 PROCEDURE — 2580000003 HC RX 258: Performed by: FAMILY MEDICINE

## 2018-09-22 RX ORDER — LOSARTAN POTASSIUM 100 MG/1
100 TABLET ORAL NIGHTLY
Qty: 30 TABLET | Refills: 3 | Status: SHIPPED | OUTPATIENT
Start: 2018-09-22

## 2018-09-22 RX ADMIN — Medication 10 ML: at 10:42

## 2018-09-22 RX ADMIN — CARVEDILOL 6.25 MG: 6.25 TABLET, FILM COATED ORAL at 10:41

## 2018-09-22 RX ADMIN — ASPIRIN 81 MG: 81 TABLET, COATED ORAL at 10:42

## 2018-09-22 RX ADMIN — PANTOPRAZOLE SODIUM 40 MG: 40 TABLET, DELAYED RELEASE ORAL at 06:30

## 2018-09-22 RX ADMIN — LEVETIRACETAM 500 MG: 500 TABLET ORAL at 10:42

## 2018-09-22 RX ADMIN — IPRATROPIUM BROMIDE AND ALBUTEROL SULFATE 3 ML: .5; 3 SOLUTION RESPIRATORY (INHALATION) at 08:04

## 2018-09-22 ASSESSMENT — PAIN SCALES - GENERAL
PAINLEVEL_OUTOF10: 0
PAINLEVEL_OUTOF10: 0

## 2018-09-22 NOTE — DISCHARGE SUMMARY
Wednesday FridayHistorical Med      vitamin D (CHOLECALCIFEROL) 5000 UNITS CAPS capsule Take 5,000 Units by mouth daily      promethazine (PHENERGAN) 12.5 MG tablet Take 12.5 mg by mouth every 6 hours as needed for Nausea      calcium carbonate (TUMS) 500 MG chewable tablet Take 2 tablets by mouth 3 times daily Historical Med      montelukast (SINGULAIR) 10 MG tablet Take 10 mg by mouth nightly. fluticasone-salmeterol (ADVAIR DISKUS) 250-50 MCG/DOSE AEPB Inhale 1 puff into the lungs every 12 hours. ipratropium-albuterol (DUONEB) 0.5-2.5 (3) MG/3ML SOLN nebulizer solution Inhale 1 vial into the lungs every 4 hours. acetaminophen (TYLENOL) 325 MG tablet Take 650 mg by mouth every 6 hours as needed for Pain.      levetiracetam (KEPPRA) 500 MG tablet TAKE 1 TABLET BY MOUTH TWICE DAILY, Disp-180 tablet, R-2**Patient requests 90 day supply**      NOVOFINE 32G X 6 MM MISC Disp-100 each, R-0, Normal      diphenoxylate-atropine (LOMOTIL) 2.5-0.025 MG per tablet TAKE 1 TABLET BY MOUTH FOUR TIMES DAILY AS NEEDED FOR DIARRHEA, Disp-60 tablet, R-5           Discharge Medication List as of 9/22/2018 11:48 AM          Discharge ROS:  A complete review of systems was asked and negative    Discharge Exam:    /76   Pulse 83   Temp 96.6 °F (35.9 °C) (Temporal)   Resp 16   Ht 4' 11.84\" (1.52 m)   Wt 156 lb 12.8 oz (71.1 kg)   SpO2 91%   BMI 30.78 kg/m²   General appearance:  NAD  HEENT:   Normal cephalic, atraumatic, moist mucous membranes, no oropharyngeal erythema or exudate  Neck: Supple, trachea midline, no anterior cervical or SC LAD  Heart[de-identified] Normal S1/S2, no S3 or S4 RRR, no murmurs or rubs. Lungs:  Clear to auscultation bilaterally, No wheeze, rales or rhonchi noted.   Abdomen: Soft, non-tender, non-distended,no organomegaly noted,  bowel sounds present, no masses  Musculoskeletal: Grossly intact,muscle strength equal and moves all four extremities 5/5 strength  Extremities: No clubbing, no Measurements +---------------+----+----+-----+----+ ! Location       ! PSV ! EDV ! Angle! RI  ! +---------------+----+----+-----+----+ ! Prox CCA       !40.8!5.35!60   !0.87! +---------------+----+----+-----+----+ ! Mid CCA        !45. 9!6.75!60   !0.85! +---------------+----+----+-----+----+ ! Dist CCA       !38. 6!6.19! 60   !0.84! +---------------+----+----+-----+----+ ! Prox ICA       !67.8!11. 8!60   !0.83! +---------------+----+----+-----+----+ ! Mid ICA        !33. 9!5.41!0    !0.84! +---------------+----+----+-----+----+ ! Prox ECA       !65.4! ! 46   !    ! +---------------+----+----+-----+----+ ! Vertebral      !46.4!    !60   !    ! +---------------+----+----+-----+----+ ! Prox Subclavian!68.3! ! 46   !    ! +---------------+----+----+-----+----+   - There is antegrade vertebral flow noted on the right side. - Additional Measurements:ICAPSV/CCAPSV 1.48. ICAEDV/CCAEDV 2.21. Carotid Left Measurements +---------------+----+----+-----+----+ ! Location       ! PSV ! EDV ! Angle! RI  ! +---------------+----+----+-----+----+ ! Prox CCA       !42. 5!8.16!60   !0.81! +---------------+----+----+-----+----+ ! Mid CCA        !40.5!7.88!60   !0.81! +---------------+----+----+-----+----+ ! Dist CCA       !39. 5!4.64!36   !0.86! +---------------+----+----+-----+----+ ! Prox ICA       ! 49  !11. 3!60   !0.77! +---------------+----+----+-----+----+ ! Mid ICA        !46. 7!85.7!67   !0.77! +---------------+----+----+-----+----+ ! Dist ICA       !70.8!17. 6! 60   !0.75! +---------------+----+----+-----+----+ ! Prox ECA       !63.1!    !60   !    ! +---------------+----+----+-----+----+ ! Vertebral      !36.1!    !60   !    ! +---------------+----+----+-----+----+ ! Prox Subclavian! 181 !    !60   !    ! +---------------+----+----+-----+----+   - There is antegrade vertebral flow noted on the left side. - Additional Measurements:ICAPSV/CCAPSV 1.75. ICAEDV/CCAEDV 2.16.     Mri Brain Without Contrast    Result Date: 9/20/2018  EXAMINATION: MRI OF THE BRAIN infarct in the left occipital lobe. 3. Chronic lacunar infarcts in the left periventricular white matter, and left putamen. 4. Moderate chronic microvascular white matter ischemic disease is noted supratentorially. 5. Stable opacification of the posterior left ethmoid air cells and left sphenoid sinus. 6.  There is a partially empty sella. Correlate with clinical signs/symptoms of endocrine dysfunction as well as serum laboratory values. The patient was seen and examined on day of discharge and this discharge summary is in conjunction with any daily progress note from day of discharge. Time Spent on discharge is 45 minutes  in the examination, evaluation, counseling and review of medications and discharge plan. Signed:    Dominga Silva MD   9/22/2018      Thank you Referring Not In System (Inactive) for the opportunity to be involved in this patient's care.  If you have any questions or concerns please feel free to contact me

## 2018-09-22 NOTE — PROGRESS NOTES
Nephrology Progress Note        [phone]        [email or web address]      Subjective:  -S/p HD yesterday (UF 700mL)  -No acute events overnight  -BPs at goal today  -No labs checked today      Review of Systems: No active or new complaints  Social History: No family at bedside      Vitals:  BP (!) 154/76   Pulse 89   Temp 97.1 °F (36.2 °C) (Temporal)   Resp 16   Ht 4' 11.84\" (1.52 m)   Wt 156 lb 12.8 oz (71.1 kg)   SpO2 96%   BMI 30.78 kg/m²   I/O last 3 completed shifts: In: 130 [P.O.:120; I.V.:10]  Out: 75 [Urine:75]  I/O this shift:  In: 240 [P.O.:240]  Out: -     Physical Exam:  Gen: NAD  HEENT: Sclera anicteric, MMM  Neck: Supple. No JVD. (+) Scar  CV: RRR, S1/S2, 2/6SM  Lungs: CTAB/L  Abd: Soft, NT, ND, (+)BS  Ext: No edema in B/L LE  Neuro: Awake/Alert, Answers questions appropriately  Skin: Warm.  No visible rashes appreciated  Access: LUE AVG w/ good thrill/bruit      Labs:  CBC with Differential:    Lab Results   Component Value Date    WBC 5.6 09/21/2018    RBC 4.32 09/21/2018    HGB 11.3 09/21/2018    HCT 34.7 09/21/2018     09/21/2018    MCV 80.3 09/21/2018    MCH 26.2 09/21/2018    MCHC 32.6 09/21/2018    RDW 16.7 09/21/2018    NRBC CANCELED 04/30/2012    NRBC CANCELED 04/30/2012    SEGSPCT 70.4 09/25/2012    BANDSPCT 1 11/07/2014    BLASTSPCT CANCELED 04/30/2012    METASPCT CANCELED 04/30/2012    LYMPHOPCT 23.9 09/19/2018    PROMYELOPCT CANCELED 04/30/2012    MONOPCT 10.0 09/19/2018    MYELOPCT CANCELED 04/30/2012    EOSPCT 2.7 04/30/2012    BASOPCT 0.4 09/19/2018    MONOSABS 0.7 09/19/2018    LYMPHSABS 1.7 09/19/2018    EOSABS 0.1 09/19/2018    BASOSABS 0.0 09/19/2018    DIFFTYPE Auto-K 09/25/2012     BMP:    Lab Results   Component Value Date     09/21/2018    K 4.2 09/21/2018    CL 92 09/21/2018    CO2 28 09/21/2018    BUN 66 09/21/2018    LABALBU 3.5 09/21/2018    CREATININE 7.6 09/21/2018    CALCIUM 8.6 09/21/2018    GFRAA 6 09/21/2018    GFRAA 27 12/17/2012    LABGLOM 5

## 2018-09-24 LAB
EKG ATRIAL RATE: 70 BPM
EKG DIAGNOSIS: NORMAL
EKG P AXIS: 66 DEGREES
EKG P-R INTERVAL: 140 MS
EKG Q-T INTERVAL: 448 MS
EKG QRS DURATION: 74 MS
EKG QTC CALCULATION (BAZETT): 483 MS
EKG R AXIS: 38 DEGREES
EKG T AXIS: 59 DEGREES
EKG VENTRICULAR RATE: 70 BPM

## 2018-10-03 ENCOUNTER — APPOINTMENT (OUTPATIENT)
Dept: MRI IMAGING | Age: 78
DRG: 070 | End: 2018-10-03
Payer: COMMERCIAL

## 2018-10-03 ENCOUNTER — HOSPITAL ENCOUNTER (INPATIENT)
Age: 78
LOS: 1 days | Discharge: NURSING, REHAB OR LONG-TERM CARE | DRG: 070 | End: 2018-10-04
Attending: EMERGENCY MEDICINE | Admitting: INTERNAL MEDICINE
Payer: COMMERCIAL

## 2018-10-03 ENCOUNTER — APPOINTMENT (OUTPATIENT)
Dept: GENERAL RADIOLOGY | Age: 78
DRG: 070 | End: 2018-10-03
Payer: COMMERCIAL

## 2018-10-03 ENCOUNTER — APPOINTMENT (OUTPATIENT)
Dept: CT IMAGING | Age: 78
DRG: 070 | End: 2018-10-03
Payer: COMMERCIAL

## 2018-10-03 DIAGNOSIS — R41.82 ALTERED MENTAL STATUS, UNSPECIFIED ALTERED MENTAL STATUS TYPE: Primary | ICD-10-CM

## 2018-10-03 PROBLEM — G93.41 ACUTE METABOLIC ENCEPHALOPATHY: Status: ACTIVE | Noted: 2018-10-03

## 2018-10-03 LAB
A/G RATIO: 1 (ref 1.1–2.2)
ALBUMIN SERPL-MCNC: 3.7 G/DL (ref 3.4–5)
ALP BLD-CCNC: 112 U/L (ref 40–129)
ALT SERPL-CCNC: 11 U/L (ref 10–40)
AMMONIA: 15 UMOL/L (ref 11–51)
ANION GAP SERPL CALCULATED.3IONS-SCNC: 20 MMOL/L (ref 3–16)
AST SERPL-CCNC: 16 U/L (ref 15–37)
BILIRUB SERPL-MCNC: 0.3 MG/DL (ref 0–1)
BUN BLDV-MCNC: 62 MG/DL (ref 7–20)
CALCIUM SERPL-MCNC: 9.9 MG/DL (ref 8.3–10.6)
CHLORIDE BLD-SCNC: 94 MMOL/L (ref 99–110)
CO2: 29 MMOL/L (ref 21–32)
CREAT SERPL-MCNC: 7.8 MG/DL (ref 0.6–1.2)
EKG ATRIAL RATE: 87 BPM
EKG DIAGNOSIS: NORMAL
EKG P AXIS: 70 DEGREES
EKG P-R INTERVAL: 160 MS
EKG Q-T INTERVAL: 400 MS
EKG QRS DURATION: 76 MS
EKG QTC CALCULATION (BAZETT): 481 MS
EKG R AXIS: -20 DEGREES
EKG T AXIS: 20 DEGREES
EKG VENTRICULAR RATE: 87 BPM
GFR AFRICAN AMERICAN: 6
GFR NON-AFRICAN AMERICAN: 5
GLOBULIN: 3.7 G/DL
GLUCOSE BLD-MCNC: 118 MG/DL (ref 70–99)
GLUCOSE BLD-MCNC: 150 MG/DL (ref 70–99)
HCT VFR BLD CALC: 37 % (ref 36–48)
HEMOGLOBIN: 12.1 G/DL (ref 12–16)
INR BLD: 1.04 (ref 0.86–1.14)
KEPPRA DOSE AMT: NORMAL
KEPPRA: 44.8 UG/ML (ref 6–46)
LIPASE: 66 U/L (ref 13–60)
MCH RBC QN AUTO: 26.6 PG (ref 26–34)
MCHC RBC AUTO-ENTMCNC: 32.6 G/DL (ref 31–36)
MCV RBC AUTO: 81.8 FL (ref 80–100)
PDW BLD-RTO: 17.2 % (ref 12.4–15.4)
PERFORMED ON: ABNORMAL
PLATELET # BLD: 226 K/UL (ref 135–450)
PMV BLD AUTO: 7.6 FL (ref 5–10.5)
POTASSIUM SERPL-SCNC: 4.8 MMOL/L (ref 3.5–5.1)
PROTHROMBIN TIME: 11.9 SEC (ref 9.8–13)
RBC # BLD: 4.53 M/UL (ref 4–5.2)
SODIUM BLD-SCNC: 143 MMOL/L (ref 136–145)
TOTAL PROTEIN: 7.4 G/DL (ref 6.4–8.2)
TROPONIN: 0.08 NG/ML
WBC # BLD: 7.2 K/UL (ref 4–11)

## 2018-10-03 PROCEDURE — 71046 X-RAY EXAM CHEST 2 VIEWS: CPT

## 2018-10-03 PROCEDURE — 94640 AIRWAY INHALATION TREATMENT: CPT

## 2018-10-03 PROCEDURE — 94664 DEMO&/EVAL PT USE INHALER: CPT

## 2018-10-03 PROCEDURE — 80177 DRUG SCRN QUAN LEVETIRACETAM: CPT

## 2018-10-03 PROCEDURE — 96375 TX/PRO/DX INJ NEW DRUG ADDON: CPT

## 2018-10-03 PROCEDURE — 6360000004 HC RX CONTRAST MEDICATION: Performed by: EMERGENCY MEDICINE

## 2018-10-03 PROCEDURE — 6360000002 HC RX W HCPCS: Performed by: INTERNAL MEDICINE

## 2018-10-03 PROCEDURE — G8997 SWALLOW GOAL STATUS: HCPCS

## 2018-10-03 PROCEDURE — 5A1D70Z PERFORMANCE OF URINARY FILTRATION, INTERMITTENT, LESS THAN 6 HOURS PER DAY: ICD-10-PCS | Performed by: INTERNAL MEDICINE

## 2018-10-03 PROCEDURE — 70498 CT ANGIOGRAPHY NECK: CPT

## 2018-10-03 PROCEDURE — 6370000000 HC RX 637 (ALT 250 FOR IP): Performed by: INTERNAL MEDICINE

## 2018-10-03 PROCEDURE — G8996 SWALLOW CURRENT STATUS: HCPCS

## 2018-10-03 PROCEDURE — 96374 THER/PROPH/DIAG INJ IV PUSH: CPT

## 2018-10-03 PROCEDURE — 99285 EMERGENCY DEPT VISIT HI MDM: CPT

## 2018-10-03 PROCEDURE — G0378 HOSPITAL OBSERVATION PER HR: HCPCS

## 2018-10-03 PROCEDURE — 94762 N-INVAS EAR/PLS OXIMTRY CONT: CPT

## 2018-10-03 PROCEDURE — 92610 EVALUATE SWALLOWING FUNCTION: CPT

## 2018-10-03 PROCEDURE — 70450 CT HEAD/BRAIN W/O DYE: CPT

## 2018-10-03 PROCEDURE — 36415 COLL VENOUS BLD VENIPUNCTURE: CPT

## 2018-10-03 PROCEDURE — 70496 CT ANGIOGRAPHY HEAD: CPT

## 2018-10-03 PROCEDURE — 80053 COMPREHEN METABOLIC PANEL: CPT

## 2018-10-03 PROCEDURE — 93010 ELECTROCARDIOGRAM REPORT: CPT | Performed by: INTERNAL MEDICINE

## 2018-10-03 PROCEDURE — 82140 ASSAY OF AMMONIA: CPT

## 2018-10-03 PROCEDURE — 87040 BLOOD CULTURE FOR BACTERIA: CPT

## 2018-10-03 PROCEDURE — 2060000000 HC ICU INTERMEDIATE R&B

## 2018-10-03 PROCEDURE — 83690 ASSAY OF LIPASE: CPT

## 2018-10-03 PROCEDURE — 2580000003 HC RX 258: Performed by: INTERNAL MEDICINE

## 2018-10-03 PROCEDURE — 70551 MRI BRAIN STEM W/O DYE: CPT

## 2018-10-03 PROCEDURE — 85027 COMPLETE CBC AUTOMATED: CPT

## 2018-10-03 PROCEDURE — 93005 ELECTROCARDIOGRAM TRACING: CPT | Performed by: EMERGENCY MEDICINE

## 2018-10-03 PROCEDURE — 85610 PROTHROMBIN TIME: CPT

## 2018-10-03 PROCEDURE — 84484 ASSAY OF TROPONIN QUANT: CPT

## 2018-10-03 RX ORDER — SODIUM CHLORIDE 0.9 % (FLUSH) 0.9 %
10 SYRINGE (ML) INJECTION PRN
Status: DISCONTINUED | OUTPATIENT
Start: 2018-10-03 | End: 2018-10-04 | Stop reason: HOSPADM

## 2018-10-03 RX ORDER — LEVETIRACETAM 5 MG/ML
500 INJECTION INTRAVASCULAR EVERY 12 HOURS
Status: DISCONTINUED | OUTPATIENT
Start: 2018-10-03 | End: 2018-10-04

## 2018-10-03 RX ORDER — SODIUM CHLORIDE 0.9 % (FLUSH) 0.9 %
10 SYRINGE (ML) INJECTION EVERY 12 HOURS SCHEDULED
Status: DISCONTINUED | OUTPATIENT
Start: 2018-10-03 | End: 2018-10-04 | Stop reason: HOSPADM

## 2018-10-03 RX ORDER — HEPARIN SODIUM 5000 [USP'U]/ML
5000 INJECTION, SOLUTION INTRAVENOUS; SUBCUTANEOUS EVERY 8 HOURS SCHEDULED
Status: DISCONTINUED | OUTPATIENT
Start: 2018-10-03 | End: 2018-10-04

## 2018-10-03 RX ORDER — IPRATROPIUM BROMIDE AND ALBUTEROL SULFATE 2.5; .5 MG/3ML; MG/3ML
1 SOLUTION RESPIRATORY (INHALATION) EVERY 4 HOURS PRN
Status: DISCONTINUED | OUTPATIENT
Start: 2018-10-03 | End: 2018-10-04 | Stop reason: HOSPADM

## 2018-10-03 RX ORDER — LEVETIRACETAM 500 MG/1
1 TABLET ORAL 2 TIMES DAILY
Status: DISCONTINUED | OUTPATIENT
Start: 2018-10-03 | End: 2018-10-03

## 2018-10-03 RX ORDER — ATORVASTATIN CALCIUM 10 MG/1
20 TABLET, FILM COATED ORAL
Status: DISCONTINUED | OUTPATIENT
Start: 2018-10-03 | End: 2018-10-04 | Stop reason: HOSPADM

## 2018-10-03 RX ORDER — ONDANSETRON 2 MG/ML
4 INJECTION INTRAMUSCULAR; INTRAVENOUS EVERY 6 HOURS PRN
Status: DISCONTINUED | OUTPATIENT
Start: 2018-10-03 | End: 2018-10-04 | Stop reason: HOSPADM

## 2018-10-03 RX ORDER — CARVEDILOL 6.25 MG/1
6.25 TABLET ORAL 2 TIMES DAILY WITH MEALS
Status: DISCONTINUED | OUTPATIENT
Start: 2018-10-03 | End: 2018-10-04

## 2018-10-03 RX ORDER — HYDRALAZINE HYDROCHLORIDE 20 MG/ML
5 INJECTION INTRAMUSCULAR; INTRAVENOUS EVERY 6 HOURS PRN
Status: DISCONTINUED | OUTPATIENT
Start: 2018-10-03 | End: 2018-10-04

## 2018-10-03 RX ORDER — MONTELUKAST SODIUM 10 MG/1
10 TABLET ORAL DAILY
Status: DISCONTINUED | OUTPATIENT
Start: 2018-10-03 | End: 2018-10-04 | Stop reason: HOSPADM

## 2018-10-03 RX ORDER — ACETAMINOPHEN 500 MG
500 TABLET ORAL EVERY 8 HOURS SCHEDULED
Status: DISCONTINUED | OUTPATIENT
Start: 2018-10-03 | End: 2018-10-04 | Stop reason: HOSPADM

## 2018-10-03 RX ORDER — LIDOCAINE AND PRILOCAINE 25; 25 MG/G; MG/G
CREAM TOPICAL PRN
COMMUNITY

## 2018-10-03 RX ORDER — LOSARTAN POTASSIUM 25 MG/1
100 TABLET ORAL NIGHTLY
Status: DISCONTINUED | OUTPATIENT
Start: 2018-10-03 | End: 2018-10-04 | Stop reason: HOSPADM

## 2018-10-03 RX ORDER — PANTOPRAZOLE SODIUM 40 MG/1
40 TABLET, DELAYED RELEASE ORAL
Status: DISCONTINUED | OUTPATIENT
Start: 2018-10-04 | End: 2018-10-04 | Stop reason: HOSPADM

## 2018-10-03 RX ORDER — DILTIAZEM HYDROCHLORIDE 60 MG/1
60 TABLET, FILM COATED ORAL
Status: DISCONTINUED | OUTPATIENT
Start: 2018-10-03 | End: 2018-10-04 | Stop reason: HOSPADM

## 2018-10-03 RX ORDER — ASPIRIN 81 MG/1
81 TABLET, CHEWABLE ORAL DAILY
Status: DISCONTINUED | OUTPATIENT
Start: 2018-10-03 | End: 2018-10-04 | Stop reason: HOSPADM

## 2018-10-03 RX ADMIN — ATORVASTATIN CALCIUM 20 MG: 10 TABLET, FILM COATED ORAL at 21:37

## 2018-10-03 RX ADMIN — IOPAMIDOL 75 ML: 755 INJECTION, SOLUTION INTRAVENOUS at 10:27

## 2018-10-03 RX ADMIN — LEVETIRACETAM 500 MG: 5 INJECTION INTRAVENOUS at 15:32

## 2018-10-03 RX ADMIN — LOSARTAN POTASSIUM 100 MG: 25 TABLET ORAL at 21:29

## 2018-10-03 RX ADMIN — DILTIAZEM HYDROCHLORIDE 60 MG: 60 TABLET, FILM COATED ORAL at 21:30

## 2018-10-03 RX ADMIN — Medication 10 ML: at 21:38

## 2018-10-03 RX ADMIN — HYDRALAZINE HYDROCHLORIDE 5 MG: 20 INJECTION INTRAMUSCULAR; INTRAVENOUS at 15:30

## 2018-10-03 RX ADMIN — ACETAMINOPHEN 500 MG: 500 TABLET ORAL at 21:29

## 2018-10-03 RX ADMIN — CARVEDILOL 6.25 MG: 6.25 TABLET, FILM COATED ORAL at 21:57

## 2018-10-03 RX ADMIN — MOMETASONE FUROATE AND FORMOTEROL FUMARATE DIHYDRATE 2 PUFF: 200; 5 AEROSOL RESPIRATORY (INHALATION) at 12:30

## 2018-10-03 RX ADMIN — MONTELUKAST SODIUM 10 MG: 10 TABLET, COATED ORAL at 21:37

## 2018-10-03 ASSESSMENT — PAIN SCALES - GENERAL
PAINLEVEL_OUTOF10: 0
PAINLEVEL_OUTOF10: 8

## 2018-10-03 ASSESSMENT — ENCOUNTER SYMPTOMS
COUGH: 0
TROUBLE SWALLOWING: 0
SHORTNESS OF BREATH: 0
PHOTOPHOBIA: 0
VOMITING: 1
WHEEZING: 0
COLOR CHANGE: 0
NAUSEA: 1
ABDOMINAL PAIN: 0
SORE THROAT: 0

## 2018-10-03 ASSESSMENT — PAIN DESCRIPTION - LOCATION: LOCATION: FOOT

## 2018-10-03 ASSESSMENT — PAIN DESCRIPTION - ORIENTATION: ORIENTATION: LEFT

## 2018-10-03 ASSESSMENT — PAIN DESCRIPTION - PAIN TYPE: TYPE: CHRONIC PAIN

## 2018-10-03 ASSESSMENT — PAIN SCALES - WONG BAKER: WONGBAKER_NUMERICALRESPONSE: 8

## 2018-10-03 NOTE — ED PROVIDER NOTES
100 mg by mouth every 4 hours as needed for Cough    CALCIUM CARBONATE (TUMS) 500 MG CHEWABLE TABLET    Take 2 tablets by mouth 3 times daily     CARVEDILOL (COREG) 6.25 MG TABLET    Take 6.25 mg by mouth 2 times daily (with meals)    DILTIAZEM (CARDIZEM) 60 MG TABLET    Take 60 mg by mouth See Admin Instructions Hold for SBP<130 and DBP<90 on Monday Wednesday Friday Twice a day    DIPHENOXYLATE-ATROPINE (LOMOTIL) 2.5-0.025 MG PER TABLET    TAKE 1 TABLET BY MOUTH FOUR TIMES DAILY AS NEEDED FOR DIARRHEA    FLUTICASONE (FLONASE) 50 MCG/ACT NASAL SPRAY    2 sprays by Each Nare route daily    FLUTICASONE-SALMETEROL (ADVAIR DISKUS) 250-50 MCG/DOSE AEPB    Inhale 1 puff into the lungs every 12 hours. INSULIN ASPART (NOVOLOG FLEXPEN) 100 UNIT/ML INJECTION PEN    Inject into the skin 3 times daily (before meals) 0-79= juice = 0 units 151-200= 2 units 201-250= 4 units 251-300= 6 units 301-350= 8 units 351+= 10 units    IPRATROPIUM-ALBUTEROL (DUONEB) 0.5-2.5 (3) MG/3ML SOLN NEBULIZER SOLUTION    Inhale 1 vial into the lungs every 4 hours. LEVETIRACETAM (KEPPRA) 500 MG TABLET    TAKE 1 TABLET BY MOUTH TWICE DAILY    LORATADINE (CLARITIN) 10 MG TABLET    Take 10 mg by mouth daily    LOSARTAN (COZAAR) 100 MG TABLET    Take 1 tablet by mouth nightly    MIDODRINE (PROAMATINE) 5 MG TABLET    Take 10 mg by mouth three times a week Monday Wednesday Friday    MONTELUKAST (SINGULAIR) 10 MG TABLET    Take 10 mg by mouth nightly.     NOVOFINE 32G X 6 MM MISC    USE WITH VICTOZA AS DIRECTED    OMEPRAZOLE (PRILOSEC) 20 MG DELAYED RELEASE CAPSULE    Take 20 mg by mouth daily    PROMETHAZINE (PHENERGAN) 12.5 MG TABLET    Take 12.5 mg by mouth every 6 hours as needed for Nausea    TIZANIDINE (ZANAFLEX) 4 MG TABLET    Take 4 mg by mouth every 12 hours as needed    VITAMIN D (CHOLECALCIFEROL) 5000 UNITS CAPS CAPSULE    Take 5,000 Units by mouth daily       ALLERGIES     Latex; Darvocet [propoxyphene n-acetaminophen]; and

## 2018-10-03 NOTE — ED NOTES
Pharmacy Medication Reconciliation Note     List of medications patient is currently taking is complete. Allergies:  Latex; Darvocet [propoxyphene n-acetaminophen]; and Heparin    Source of information:   1. Home at [facility]  2. nurse    Notes regarding home medications:   1. Patient only received omeprazole this morning  2.  Some of patients medications are only given on Mon, Weds, Friday    Denies any other OTC/herbal meds    Nohemi Mi PharmD  10/3/2018  11:20 AM

## 2018-10-03 NOTE — H&P
(LIPITOR) 20 MG tablet Take 20 mg by mouth three times a week Monday Wednesday Friday   Yes Historical Provider, MD   carvedilol (COREG) 6.25 MG tablet Take 6.25 mg by mouth 2 times daily (with meals)   Yes Historical Provider, MD   diltiazem (CARDIZEM) 60 MG tablet Take 60 mg by mouth See Admin Instructions Twice a day on Monday Wednesday Friday   Hold for SBP<130 and DBP<90 on   Yes Historical Provider, MD   fluticasone (FLONASE) 50 MCG/ACT nasal spray 2 sprays by Each Nare route daily   Yes Historical Provider, MD   loratadine (CLARITIN) 10 MG tablet Take 10 mg by mouth daily   Yes Historical Provider, MD   insulin aspart (NOVOLOG FLEXPEN) 100 UNIT/ML injection pen Inject into the skin 3 times daily (before meals) 0-79= juice = 0 units 151-200= 2 units 201-250= 4 units 251-300= 6 units 301-350= 8 units 351+= 10 units   Yes Historical Provider, MD   omeprazole (PRILOSEC) 20 MG delayed release capsule Take 20 mg by mouth daily   Yes Historical Provider, MD   b complex-C-folic acid (NEPHROCAPS) 1 MG capsule Take 1 capsule by mouth three times a week Monday Wednesday Friday   Yes Historical Provider, MD   benzonatate (TESSALON) 100 MG capsule Take 100 mg by mouth every 4 hours as needed for Cough   Yes Historical Provider, MD   tiZANidine (ZANAFLEX) 4 MG tablet Take 4 mg by mouth every 12 hours as needed (spasms)    Yes Historical Provider, MD   baclofen (LIORESAL) 10 MG tablet Take 10 mg by mouth 3 times daily as needed (spasms)    Yes Historical Provider, MD   midodrine (PROAMATINE) 5 MG tablet Take 10 mg by mouth three times a week Monday Wednesday Friday   Yes Historical Provider, MD   vitamin D (CHOLECALCIFEROL) 5000 UNITS CAPS capsule Take 5,000 Units by mouth daily   Yes Historical Provider, MD   promethazine (PHENERGAN) 12.5 MG tablet Take 12.5 mg by mouth every 6 hours as needed for Nausea   Yes Historical Provider, MD   calcium carbonate (TUMS) 500 MG chewable tablet Take 2 tablets by mouth 3 times

## 2018-10-03 NOTE — ED NOTES
Unable to provide urine sample at this time.  Pt is dialysis pt      Katheryn Meigs, RN  10/03/18 1114

## 2018-10-03 NOTE — ED NOTES
Bed: [facility]  Expected date:   Expected time:   Means of arrival:   Comments:  starla Mccullough  10/03/18 7237

## 2018-10-03 NOTE — CONSULTS
Spouse name: N/A    Number of children: 4    Years of education: 15     Occupational History    Not on file. Social History Main Topics    Smoking status: Never Smoker    Smokeless tobacco: Never Used    Alcohol use No    Drug use: No    Sexual activity: No     Other Topics Concern    Not on file     Social History Narrative    No narrative on file       Family History:   Family History   Problem Relation Age of Onset    Diabetes Other     Hypertension Other     Coronary Art Dis Other     Stroke Other     Breast Cancer Other            ROS unable to obtain because of pt's factors. PHYSICAL EXAM:      Vitals:    BP (!) 221/90   Pulse 82   Temp 98.1 °F (36.7 °C) (Oral)   Resp 16   Wt 161 lb (73 kg)   SpO2 100%   BMI 31.61 kg/m²   No intake/output data recorded. No intake/output data recorded. Physical Exam:  General : AAOx1, not in pain or respiratory distress, resting in bed  HEENT : mucosa moist.  CVS: S1 S2 normal, regular rhythm, no murmurs or rubs. Lungs: Clear, no wheezing or crackles. Abd: Soft, bowel sounds normal, non-tender. Ext: No edema, no cyanosis  Skin: Warm. No rashes appreciated. : bladder non-distended, no tenderness over the bladder  Neuro: does not follow commands, but awake, unable to assess  Joints: No erythema noted over joints.     Access : left arm AVG +, thrill/bruit+    DATA:    CBC with Differential:    Lab Results   Component Value Date    WBC 7.2 10/03/2018    RBC 4.53 10/03/2018    HGB 12.1 10/03/2018    HCT 37.0 10/03/2018     10/03/2018    MCV 81.8 10/03/2018    MCH 26.6 10/03/2018    MCHC 32.6 10/03/2018    RDW 17.2 10/03/2018    NRBC CANCELED 04/30/2012    NRBC CANCELED 04/30/2012    SEGSPCT 70.4 09/25/2012    BANDSPCT 1 11/07/2014    BLASTSPCT CANCELED 04/30/2012    METASPCT CANCELED 04/30/2012    LYMPHOPCT 23.9 09/19/2018    PROMYELOPCT CANCELED 04/30/2012    MONOPCT 10.0 09/19/2018    MYELOPCT CANCELED 04/30/2012    EOSPCT 2.7 04/30/2012    BASOPCT 0.4 09/19/2018    MONOSABS 0.7 09/19/2018    LYMPHSABS 1.7 09/19/2018    EOSABS 0.1 09/19/2018    BASOSABS 0.0 09/19/2018    DIFFTYPE Auto-K 09/25/2012     BMP:    Lab Results   Component Value Date     10/03/2018    K 4.8 10/03/2018    CL 94 10/03/2018    CO2 29 10/03/2018    BUN 62 10/03/2018    LABALBU 3.7 10/03/2018    CREATININE 7.8 10/03/2018    CALCIUM 9.9 10/03/2018    GFRAA 6 10/03/2018    GFRAA 27 12/17/2012    LABGLOM 5 10/03/2018    GLUCOSE 150 10/03/2018     Magnesium:    Lab Results   Component Value Date    MG 2.20 02/12/2018     Phosphorus:    Lab Results   Component Value Date    PHOS 5.5 09/21/2018     U/A:    Lab Results   Component Value Date    NITRITE n 07/21/2010    COLORU YELLOW 09/05/2015    PROTEINU 100 09/05/2015    PHUR 8.5 09/05/2015    WBCUA 214 09/05/2015    RBCUA 4 09/05/2015    BACTERIA 4+ 09/05/2015    CLARITYU TURBID 09/05/2015    SPECGRAV 1.009 09/05/2015    LEUKOCYTESUR LARGE 09/05/2015    UROBILINOGEN 0.2 09/05/2015    BILIRUBINUR Negative 09/05/2015    BILIRUBINUR Moderate 03/08/2012    BLOODU SMALL 09/05/2015    GLUCOSEU Negative 09/05/2015    GLUCOSEU Negative 03/08/2012       ASSESSMENT/PLAN:      1. ESRD on HD MWF. No urgent indication for hemodialysis today even though she is due as per schedule today. I will wait to see if she has an acute CVA before doing intermittent hemodialysis. Will reassess after more work-up is done to evaluate for possible CVA. Ok to proceed with IV contrast - discussed with Dr. Asia Maldonado. 2. Acute encephalopathy CTA pending. Neurology consulted. 3. Hypertensive urgency ? Compliance with meds. Will wait to treat until CVA ruled out. May need permissive HTN if diagnosed with CVA. 4. Anemia in CKD Hb above goal. No need of ESAs  5. Renal Osteodystrophy check phos. Thank you for allowing me to participate in the care of this patient. I will continue to follow along. Please call with questions.     Juan Mcintyre

## 2018-10-03 NOTE — ED NOTES
Dr Parish Fragoso, admitting MD, in to see pt.  Informed him that pt did not pass swallow jose de jesus Vincent RN  10/03/18 [id number]

## 2018-10-03 NOTE — ED TRIAGE NOTES
pt arrived via [facility] ambulance for confusion prior to dialysis. pt normally a/ox4, alert to self right now and having difficulty finding words. pt recently at [facility] for same complaint and for HTN. per EMS pt was not responsive prior but is now, /96 and last BP for [facility] 118/94. pt is currently alert to self, knows she is in the hospital.  reports chronic numbness in hands and feet but states it is \"different\". denies pain.  EDMD at bedside

## 2018-10-03 NOTE — FLOWSHEET NOTE
10/03/18 0909   Vital Signs   Temp 98.1 °F (36.7 °C)   Temp Source Oral   Pulse 82   Resp 16   BP (!) 221/90   MAP (mmHg) 124   Level of Consciousness 0   MEWS Score 3   Patient Currently in Pain Yes   Height and Weight   Weight 161 lb (73 kg)   Weight Method Actual;Bed scale   BMI (Calculated) 0   Pain Assessment   Osei-Freeman Pain Rating 8   Pain Type Chronic pain   Pain Location Foot   Pain Orientation Left   Oxygen Therapy   SpO2 100 %   O2 Device None (Room air)       Dr Colene Dancer aware of BP

## 2018-10-04 VITALS
OXYGEN SATURATION: 95 % | RESPIRATION RATE: 18 BRPM | HEIGHT: 63 IN | SYSTOLIC BLOOD PRESSURE: 155 MMHG | HEART RATE: 87 BPM | WEIGHT: 154.1 LBS | BODY MASS INDEX: 27.3 KG/M2 | DIASTOLIC BLOOD PRESSURE: 86 MMHG | TEMPERATURE: 98 F

## 2018-10-04 LAB
ANION GAP SERPL CALCULATED.3IONS-SCNC: 18 MMOL/L (ref 3–16)
BASOPHILS ABSOLUTE: 0.1 K/UL (ref 0–0.2)
BASOPHILS RELATIVE PERCENT: 1 %
BUN BLDV-MCNC: 70 MG/DL (ref 7–20)
CALCIUM SERPL-MCNC: 9.1 MG/DL (ref 8.3–10.6)
CHLORIDE BLD-SCNC: 93 MMOL/L (ref 99–110)
CO2: 28 MMOL/L (ref 21–32)
CREAT SERPL-MCNC: 9 MG/DL (ref 0.6–1.2)
EOSINOPHILS ABSOLUTE: 0.1 K/UL (ref 0–0.6)
EOSINOPHILS RELATIVE PERCENT: 2.3 %
GFR AFRICAN AMERICAN: 5
GFR NON-AFRICAN AMERICAN: 4
GLUCOSE BLD-MCNC: 118 MG/DL (ref 70–99)
GLUCOSE BLD-MCNC: 118 MG/DL (ref 70–99)
GLUCOSE BLD-MCNC: 271 MG/DL (ref 70–99)
HCT VFR BLD CALC: 30.9 % (ref 36–48)
HEMOGLOBIN: 10.2 G/DL (ref 12–16)
LV EF: 50 %
LVEF MODALITY: NORMAL
LYMPHOCYTES ABSOLUTE: 1.5 K/UL (ref 1–5.1)
LYMPHOCYTES RELATIVE PERCENT: 27.8 %
MCH RBC QN AUTO: 26.9 PG (ref 26–34)
MCHC RBC AUTO-ENTMCNC: 33.1 G/DL (ref 31–36)
MCV RBC AUTO: 81.3 FL (ref 80–100)
MONOCYTES ABSOLUTE: 0.6 K/UL (ref 0–1.3)
MONOCYTES RELATIVE PERCENT: 11.3 %
NEUTROPHILS ABSOLUTE: 3.1 K/UL (ref 1.7–7.7)
NEUTROPHILS RELATIVE PERCENT: 57.6 %
PDW BLD-RTO: 16.6 % (ref 12.4–15.4)
PERFORMED ON: ABNORMAL
PERFORMED ON: ABNORMAL
PLATELET # BLD: 240 K/UL (ref 135–450)
PMV BLD AUTO: 7.8 FL (ref 5–10.5)
POTASSIUM REFLEX MAGNESIUM: 4.9 MMOL/L (ref 3.5–5.1)
RBC # BLD: 3.8 M/UL (ref 4–5.2)
SODIUM BLD-SCNC: 139 MMOL/L (ref 136–145)
WBC # BLD: 5.5 K/UL (ref 4–11)

## 2018-10-04 PROCEDURE — G0378 HOSPITAL OBSERVATION PER HR: HCPCS

## 2018-10-04 PROCEDURE — 94640 AIRWAY INHALATION TREATMENT: CPT

## 2018-10-04 PROCEDURE — 92523 SPEECH SOUND LANG COMPREHEN: CPT

## 2018-10-04 PROCEDURE — 90937 HEMODIALYSIS REPEATED EVAL: CPT

## 2018-10-04 PROCEDURE — 96376 TX/PRO/DX INJ SAME DRUG ADON: CPT

## 2018-10-04 PROCEDURE — 6370000000 HC RX 637 (ALT 250 FOR IP): Performed by: INTERNAL MEDICINE

## 2018-10-04 PROCEDURE — 93308 TTE F-UP OR LMTD: CPT

## 2018-10-04 PROCEDURE — G8979 MOBILITY GOAL STATUS: HCPCS | Performed by: PHYSICAL THERAPIST

## 2018-10-04 PROCEDURE — G8978 MOBILITY CURRENT STATUS: HCPCS | Performed by: PHYSICAL THERAPIST

## 2018-10-04 PROCEDURE — 85025 COMPLETE CBC W/AUTO DIFF WBC: CPT

## 2018-10-04 PROCEDURE — 92526 ORAL FUNCTION THERAPY: CPT

## 2018-10-04 PROCEDURE — G0257 UNSCHED DIALYSIS ESRD PT HOS: HCPCS

## 2018-10-04 PROCEDURE — 94664 DEMO&/EVAL PT USE INHALER: CPT

## 2018-10-04 PROCEDURE — 94761 N-INVAS EAR/PLS OXIMETRY MLT: CPT

## 2018-10-04 PROCEDURE — 80048 BASIC METABOLIC PNL TOTAL CA: CPT

## 2018-10-04 PROCEDURE — 97162 PT EVAL MOD COMPLEX 30 MIN: CPT | Performed by: PHYSICAL THERAPIST

## 2018-10-04 PROCEDURE — 6360000002 HC RX W HCPCS: Performed by: INTERNAL MEDICINE

## 2018-10-04 PROCEDURE — 2580000003 HC RX 258: Performed by: INTERNAL MEDICINE

## 2018-10-04 RX ORDER — HYDRALAZINE HYDROCHLORIDE 20 MG/ML
10 INJECTION INTRAMUSCULAR; INTRAVENOUS EVERY 6 HOURS PRN
Status: DISCONTINUED | OUTPATIENT
Start: 2018-10-04 | End: 2018-10-04 | Stop reason: HOSPADM

## 2018-10-04 RX ORDER — SODIUM CHLORIDE 9 MG/ML
INJECTION, SOLUTION INTRAVENOUS
Status: DISCONTINUED
Start: 2018-10-04 | End: 2018-10-04 | Stop reason: HOSPADM

## 2018-10-04 RX ORDER — LEVETIRACETAM 500 MG/1
500 TABLET ORAL 2 TIMES DAILY
Status: DISCONTINUED | OUTPATIENT
Start: 2018-10-04 | End: 2018-10-04

## 2018-10-04 RX ORDER — LEVETIRACETAM 500 MG/1
500 TABLET ORAL 2 TIMES DAILY
Status: DISCONTINUED | OUTPATIENT
Start: 2018-10-04 | End: 2018-10-04 | Stop reason: HOSPADM

## 2018-10-04 RX ORDER — CARVEDILOL 12.5 MG/1
12.5 TABLET ORAL 2 TIMES DAILY WITH MEALS
Status: DISCONTINUED | OUTPATIENT
Start: 2018-10-04 | End: 2018-10-04 | Stop reason: HOSPADM

## 2018-10-04 RX ADMIN — LEVETIRACETAM 500 MG: 5 INJECTION INTRAVENOUS at 02:22

## 2018-10-04 RX ADMIN — Medication 10 ML: at 09:40

## 2018-10-04 RX ADMIN — ASPIRIN 81 MG CHEWABLE TABLET 81 MG: 81 TABLET CHEWABLE at 09:38

## 2018-10-04 RX ADMIN — ACETAMINOPHEN 500 MG: 500 TABLET ORAL at 13:03

## 2018-10-04 RX ADMIN — ACETAMINOPHEN 500 MG: 500 TABLET ORAL at 06:01

## 2018-10-04 RX ADMIN — MOMETASONE FUROATE AND FORMOTEROL FUMARATE DIHYDRATE 2 PUFF: 200; 5 AEROSOL RESPIRATORY (INHALATION) at 08:10

## 2018-10-04 RX ADMIN — PANTOPRAZOLE SODIUM 40 MG: 40 TABLET, DELAYED RELEASE ORAL at 06:01

## 2018-10-04 RX ADMIN — CARVEDILOL 6.25 MG: 6.25 TABLET, FILM COATED ORAL at 09:38

## 2018-10-04 RX ADMIN — LEVETIRACETAM 500 MG: 500 TABLET, FILM COATED ORAL at 14:37

## 2018-10-04 ASSESSMENT — PAIN SCALES - GENERAL
PAINLEVEL_OUTOF10: 0
PAINLEVEL_OUTOF10: 2
PAINLEVEL_OUTOF10: 0

## 2018-10-04 NOTE — PLAN OF CARE
Problem: Nutrition  Goal: Optimal nutrition therapy  Outcome: Ongoing  Nutrition Problem: No nutrition diagnosis at this time  Intervention: Food and/or Nutrient Delivery: Continue current diet  Nutritional Goals: Consume >50% of all meals during this hospital stay

## 2018-10-04 NOTE — PROGRESS NOTES
Nutrition Assessment    Type and Reason for Visit: Initial, Positive Nutrition Screen    Malnutrition Assessment:  · Malnutrition Status: No malnutrition    Nutrition Diagnosis:   · Problem: No nutrition diagnosis at this time    Nutrition Assessment:  · Subjective Assessment: Pt admitted for acute metabolic encephalopathy, and is an initial positive screen for unplanned wt loss. Pt seems confused during assessment, reports no weight loss, with some decrease in appetite prior to admit but reports appetite is back to normal. Pt PMH includes DM II, HLD, ESRD III, Hemodialysis, Afib, and seizure disorder. Nutrition Risk Level   Risk Level: Low    Nutrition Intervention  Food and/or Delivery: Continue current diet  Nutrition Education/Counseling/Coordination of Care:  Continued Inpatient Monitoring, Education Not Indicated    Patient assessed for nutrition risk. Deemed to be at low risk at this time. Will continue to follow patient.       Electronically signed by Truman Bautista RD, LISA on 10/4/18 at 10:19 AM    Contact Number: [phone]
Speech Language Pathology  [facility]  Initial Speech/Language/Cognitive Assessment    NAME: Irene Martinez  : 1940   MRN: [id number]  ADMISSION DATE: 10/3/2018  ADMITTING DIAGNOSIS: Acute metabolic encephalopathy [W53.64]     Irene Martinez has DM (diabetes mellitus), secondary, uncontrolled, w/neurologic complic ([facility] [address] DJD (degenerative joint disease) of knee; CKD (chronic kidney disease); Hyperlipidemia; Cerebral infarction [facility]); DVT (deep venous thrombosis) ([facility] [address] Cystitis; Hematuria; CRISTAL (acute kidney injury) ([facility] [address] Acute blood loss anemia; HTN (hypertension); Acute encephalopathy; Anemia; Seizure ([facility] [address] Weakness; Syncope; ESRD (end stage renal disease) ([facility] [address] A-fib ([facility] [address] Acute respiratory failure ([facility] [address] Aphasia; Word finding difficulty; DAVID (renal osteodystrophy); Encephalopathy, hypertensive; and Acute metabolic encephalopathy on her problem list.    DATE ONSET: 10/3/2018    Date of Eval: 10/4/2018   Evaluating Therapist: CARLOS Izaguirre    Chart Review:  10/4/2018 Discharge Summary:  Diagnostic Assesment and Plan : 1.  Admitted for acute metabolic and toxic  encephalopathy, severe confusion, oriented to self in ed , unable to follow commands, this likely represents home medication effect, her  baclofen, Claritin, Phenergan, they're  all on hold now. Sx lot improved, she had neg mri of brain for acute cva, she is stable for dc back to snf  2.  ESRD on hemodialysis, she has left forearm AV fistula, positive murmur on examination, she  Got  Hemodialysis 10. 3.18  . Spoke with Dr. Fermin Mejia 3.  Uncontrolled hypertension in ed ,  she did not get medications because she failed speech and swallow evaluation  in emergency room, today its better , keven po well. 4.  History of seizures on Keppra, changed to IV now back to po . 5.  Chronic stable asthma, clear lungs and examination. Obesity.   10/3/2018 BSE:  Dysphagia Diagnosis: Mild oral stage dysphagia;Mild
0452   WBC  7.2  5.5   RBC  4.53  3.80*   HGB  12.1  10.2*   HCT  37.0  30.9*   PLT  226  240     BMP:  Recent Labs      10/03/18   0921  10/04/18   0452   NA  143  139   K  4.8  4.9   CL  94*  93*   CO2  29  28   BUN  62*  70*   CREATININE  7.8*  9.0*   CALCIUM  9.9  9.1   GLUCOSE  150*  118*     Phosphorus:  No results for input(s): PHOS in the last 72 hours. Magnesium:  No results for input(s): MG in the last 72 hours. ASSESSMENT     Patient Active Problem List   Diagnosis    DM (diabetes mellitus), secondary, uncontrolled, w/neurologic complic ([address]    DJD (degenerative joint disease) of knee    CKD (chronic kidney disease)    Hyperlipidemia    Cerebral infarction ([address]    [facility]) [facility]    Cystitis    Hematuria    CRISTAL (acute kidney injury) ([address]    Acute blood loss anemia    HTN (hypertension)    Acute encephalopathy    Anemia    Seizure ([facility]    Weakness    Syncope    ESRD (end stage renal disease) ([address]    A-fib ([facility]    Acute respiratory failure ([address]    Aphasia    Word finding difficulty    DAVID (renal osteodystrophy)    Encephalopathy, hypertensive    Acute metabolic encephalopathy       PLAN  1. ESRD on HD MWF. Did not have HD yesterday Plan o HD today   2. Acute encephalopathy MRI completed  Neurology consulted. 3. Hypertensive urgency ? Increase Coreg dose Will not lower BP significantly in the setting of possible CVA  4. Anemia in CKD Hb above goal. No need of ESAs  5.  Renal Osteodystrophy check phos    Neri Cates MD, [address]

## 2018-10-04 NOTE — DISCHARGE SUMMARY
Hospital Medicine Discharge Summary    Patient ID: Ayad Neely      Patient's PCP: Referring Not In System (Inactive)    Admit Date: 10/3/2018     Discharge Date:   10.4.18    Admitting Physician: Manuel Choudhury MD     Discharge Physician: Kavin Connolly. Mellissa Rockwell MD     Discharge Diagnoses: Active Hospital Problems    Diagnosis Date Noted    Acute metabolic encephalopathy [O08.08] 10/03/2018       The patient was seen and examined on day of discharge and this discharge summary is in conjunction with any daily progress note from day of discharge. Chief Complaint: ams     Subjective: better, awake , alert self, maintains good communication,      ROS: as noted above and  All other systems reviewed and negative. Exam performed by me:    BP (!) 173/82   Pulse 78   Temp 97.7 °F (36.5 °C) (Axillary)   Resp 16   Ht 5' 3\" (1.6 m)   Wt 153 lb 14.1 oz (69.8 kg)   SpO2 95%   BMI 27.26 kg/m²     General appearance: No apparent distress, appears stated age   HEENT: Pupils equal, round, and reactive to light. Neck: Supple, with full range of motion. No jugular venous distention   Respiratory:  Normal respiratory effort. Clear to auscultation   Cardiovascular: Regular rate and rhythm with normal S1/S2    Abdomen: Soft, non-tender, non-distended with normal bowel sounds. Musculoskelatal: No clubbing, cyanosis or edema bilaterally. Skin: Skin color, texture, turgor normal.  No rashes or lesions. Neurologic:  Neurovascularly intact   grossly non-focal.  Psychiatric: Alert and oriented x3.     Labs:   Recent Labs      10/03/18   0921  10/04/18   0452   WBC  7.2  5.5   HGB  12.1  10.2*   HCT  37.0  30.9*   PLT  226  240     Recent Labs      10/03/18   0921  10/04/18   0452   NA  143  139   K  4.8  4.9   CL  94*  93*   CO2  29  28   BUN  62*  70*   CREATININE  7.8*  9.0*   CALCIUM  9.9  9.1     Recent Labs      10/03/18   0921   AST  16   ALT  11   BILITOT  0.3   ALKPHOS  112     Recent Labs 10/03/18   0926   INR  1.04     Recent Labs      10/03/18   0921   TROPONINI  0.08[facility]ILITY course    Active Hospital Problems    Diagnosis Date Noted    Acute metabolic encephalopathy [G75.13] 10/03/2018   Ct head angio     No intracranial arterial large vessel occlusion.  There is moderate-severe  narrowing of distal left posterior cerebral artery.  Moderate stenosis of the  intracranial internal carotid arteries. No evidence of arterial flow-limiting stenosis in the neck.  There is  significant tortuosity of the carotid arteries bilaterally. A small plaque  ulceration anteriorly and laterally is suspected at level of left common  carotid artery terminus. Patchy ground-glass opacity within the right lung may be  infectious/inflammatory. Evidence of chronic sinusitis. Reviewed his/her  old charts [facility], dated 9.0 , showed admission for acute mental status changes, MRI  was done to rule out acute stroke, she does not have any stroke, patient was seen by neurology and nephrology and she got her hemodialysis, patient was discharged back to nursing home     Diagnostic Assesment and Plan :   1. Admitted for acute metabolic and toxic  encephalopathy, severe confusion, oriented to self in ed , unable to follow commands, this likely represents home medication effect, her  baclofen, Claritin, Phenergan, they're  all on hold now. Sx lot improved, she had neg mri of brain for acute cva, she is stable for dc back to snf   2. ESRD on hemodialysis, she has left forearm AV fistula, positive murmur on examination, she  Got  Hemodialysis 10. 3.18  . Spoke with Dr. Fuad Squires  3. Uncontrolled hypertension in ed ,  she did not get medications because she failed speech and swallow evaluation  in emergency room, today its better , keven po well. 4.  History of seizures on Keppra, changed to IV now back to po .  5. Chronic stable asthma, clear lungs and examination.   Obesity.     Body mass index is capsule Take 20 mg by mouth daily      b complex-C-folic acid (NEPHROCAPS) 1 MG capsule Take 1 capsule by mouth three times a week Monday Wednesday Friday      midodrine (PROAMATINE) 5 MG tablet Take 10 mg by mouth three times a week Monday Wednesday Friday      vitamin D (CHOLECALCIFEROL) 5000 UNITS CAPS capsule Take 5,000 Units by mouth daily      calcium carbonate (TUMS) 500 MG chewable tablet Take 2 tablets by mouth 3 times daily       montelukast (SINGULAIR) 10 MG tablet Take 10 mg by mouth daily       fluticasone-salmeterol (ADVAIR DISKUS) 250-50 MCG/DOSE AEPB Inhale 1 puff into the lungs every 12 hours. ipratropium-albuterol (DUONEB) 0.5-2.5 (3) MG/3ML SOLN nebulizer solution Inhale 1 vial into the lungs every 4 hours as needed for Shortness of Breath       acetaminophen (TYLENOL) 325 MG tablet Take 650 mg by mouth every 6 hours as needed for Pain.      levetiracetam (KEPPRA) 500 MG tablet TAKE 1 TABLET BY MOUTH TWICE DAILY  Qty: 180 tablet, Refills: 2    Comments: **Patient requests 90 day supply**      NOVOFINE 32G X 6 MM MISC USE WITH VICTOZA AS DIRECTED  Qty: 100 each, Refills: 0             Time Spent on discharge is more than 30 minutes in the examination, evaluation, counseling and review of medications and discharge plan with the patient. Family was notified regarding discharge. The patient [facility] was advised to consult their PCP/ or call 911 to proceed to nearest ED in the event if symptoms are not getting better and are getting worse . The note was completed using EMR. Every effort was made to ensure accuracy; however, inadvertent computerized transcription errors may be present. Signed:    Santos Arias MD   10/4/2018      Thank you Referring Not In System (Inactive) for the opportunity to be involved in this patient's care. If you have any questions or concerns please feel free to contact me at [phone].

## 2018-10-08 LAB — BLOOD CULTURE, ROUTINE: NORMAL

## 2018-12-04 ENCOUNTER — HOSPITAL ENCOUNTER (OUTPATIENT)
Dept: CT IMAGING | Age: 78
Discharge: HOME | End: 2018-12-04
Payer: COMMERCIAL

## 2018-12-04 DIAGNOSIS — R91.8 LUNG MASS: ICD-10-CM

## 2018-12-04 PROCEDURE — 71260 CT THORAX DX C+: CPT

## 2018-12-04 PROCEDURE — 6360000004 HC RX CONTRAST MEDICATION: Performed by: SPECIALIST

## 2018-12-04 RX ADMIN — IOPAMIDOL 75 ML: 755 INJECTION, SOLUTION INTRAVENOUS at 07:54

## 2019-02-11 ENCOUNTER — HOSPITAL ENCOUNTER (INPATIENT)
Age: 79
LOS: 3 days | Discharge: NURSING, REHAB OR LONG-TERM CARE | DRG: 070 | End: 2019-02-14
Attending: EMERGENCY MEDICINE | Admitting: INTERNAL MEDICINE
Payer: MEDICARE

## 2019-02-11 ENCOUNTER — APPOINTMENT (OUTPATIENT)
Dept: CT IMAGING | Age: 79
DRG: 070 | End: 2019-02-11
Payer: MEDICARE

## 2019-02-11 ENCOUNTER — APPOINTMENT (OUTPATIENT)
Dept: GENERAL RADIOLOGY | Age: 79
DRG: 070 | End: 2019-02-11
Payer: MEDICARE

## 2019-02-11 DIAGNOSIS — Z99.2 ESRD ON DIALYSIS (HCC): ICD-10-CM

## 2019-02-11 DIAGNOSIS — N39.0 URINARY TRACT INFECTION WITHOUT HEMATURIA, SITE UNSPECIFIED: ICD-10-CM

## 2019-02-11 DIAGNOSIS — E87.5 HYPERKALEMIA: ICD-10-CM

## 2019-02-11 DIAGNOSIS — G93.40 ACUTE ENCEPHALOPATHY: Primary | ICD-10-CM

## 2019-02-11 DIAGNOSIS — N18.6 ESRD ON DIALYSIS (HCC): ICD-10-CM

## 2019-02-11 LAB
A/G RATIO: 0.8 (ref 1.1–2.2)
ALBUMIN SERPL-MCNC: 3.5 G/DL (ref 3.4–5)
ALBUMIN SERPL-MCNC: 3.7 G/DL (ref 3.4–5)
ALP BLD-CCNC: 89 U/L (ref 40–129)
ALT SERPL-CCNC: <5 U/L (ref 10–40)
ANION GAP SERPL CALCULATED.3IONS-SCNC: 14 MMOL/L (ref 3–16)
ANION GAP SERPL CALCULATED.3IONS-SCNC: 19 MMOL/L (ref 3–16)
AST SERPL-CCNC: 89 U/L (ref 15–37)
BACTERIA: ABNORMAL /HPF
BASOPHILS ABSOLUTE: 0.1 K/UL (ref 0–0.2)
BASOPHILS RELATIVE PERCENT: 0.4 %
BILIRUB SERPL-MCNC: 0.6 MG/DL (ref 0–1)
BILIRUBIN URINE: NEGATIVE
BLOOD, URINE: ABNORMAL
BUN BLDV-MCNC: 56 MG/DL (ref 7–20)
BUN BLDV-MCNC: 60 MG/DL (ref 7–20)
CALCIUM SERPL-MCNC: 9.5 MG/DL (ref 8.3–10.6)
CALCIUM SERPL-MCNC: 9.7 MG/DL (ref 8.3–10.6)
CHLORIDE BLD-SCNC: 96 MMOL/L (ref 99–110)
CHLORIDE BLD-SCNC: 99 MMOL/L (ref 99–110)
CLARITY: ABNORMAL
CO2: 21 MMOL/L (ref 21–32)
CO2: 22 MMOL/L (ref 21–32)
COLOR: YELLOW
CREAT SERPL-MCNC: 8.5 MG/DL (ref 0.6–1.2)
CREAT SERPL-MCNC: 8.8 MG/DL (ref 0.6–1.2)
EKG ATRIAL RATE: 84 BPM
EKG DIAGNOSIS: NORMAL
EKG P AXIS: 57 DEGREES
EKG P-R INTERVAL: 150 MS
EKG Q-T INTERVAL: 390 MS
EKG QRS DURATION: 76 MS
EKG QTC CALCULATION (BAZETT): 460 MS
EKG R AXIS: 20 DEGREES
EKG T AXIS: -34 DEGREES
EKG VENTRICULAR RATE: 84 BPM
EOSINOPHILS ABSOLUTE: 0 K/UL (ref 0–0.6)
EOSINOPHILS RELATIVE PERCENT: 0.1 %
EPITHELIAL CELLS, UA: 3 /HPF (ref 0–5)
GFR AFRICAN AMERICAN: 5
GFR AFRICAN AMERICAN: 5
GFR NON-AFRICAN AMERICAN: 4
GFR NON-AFRICAN AMERICAN: 5
GLOBULIN: 4.5 G/DL
GLUCOSE BLD-MCNC: 115 MG/DL (ref 70–99)
GLUCOSE BLD-MCNC: 144 MG/DL (ref 70–99)
GLUCOSE BLD-MCNC: 80 MG/DL (ref 70–99)
GLUCOSE BLD-MCNC: 93 MG/DL (ref 70–99)
GLUCOSE URINE: 100 MG/DL
HCT VFR BLD CALC: 36.1 % (ref 36–48)
HEMOGLOBIN: 11.5 G/DL (ref 12–16)
KETONES, URINE: NEGATIVE MG/DL
LEUKOCYTE ESTERASE, URINE: ABNORMAL
LYMPHOCYTES ABSOLUTE: 0.7 K/UL (ref 1–5.1)
LYMPHOCYTES RELATIVE PERCENT: 4.8 %
MCH RBC QN AUTO: 27.2 PG (ref 26–34)
MCHC RBC AUTO-ENTMCNC: 31.9 G/DL (ref 31–36)
MCV RBC AUTO: 85.4 FL (ref 80–100)
MICROSCOPIC EXAMINATION: YES
MONOCYTES ABSOLUTE: 0.8 K/UL (ref 0–1.3)
MONOCYTES RELATIVE PERCENT: 5.1 %
NEUTROPHILS ABSOLUTE: 13.6 K/UL (ref 1.7–7.7)
NEUTROPHILS RELATIVE PERCENT: 89.6 %
NITRITE, URINE: NEGATIVE
PDW BLD-RTO: 18.6 % (ref 12.4–15.4)
PERFORMED ON: ABNORMAL
PERFORMED ON: NORMAL
PH UA: 8
PHOSPHORUS: 4.4 MG/DL (ref 2.5–4.9)
PHOSPHORUS: 5.2 MG/DL (ref 2.5–4.9)
PLATELET # BLD: 279 K/UL (ref 135–450)
PMV BLD AUTO: 7.9 FL (ref 5–10.5)
POTASSIUM SERPL-SCNC: 4.6 MMOL/L (ref 3.5–5.1)
POTASSIUM SERPL-SCNC: 6.1 MMOL/L (ref 3.5–5.1)
PROTEIN UA: 100 MG/DL
RBC # BLD: 4.23 M/UL (ref 4–5.2)
RBC UA: 2 /HPF (ref 0–4)
SODIUM BLD-SCNC: 132 MMOL/L (ref 136–145)
SODIUM BLD-SCNC: 139 MMOL/L (ref 136–145)
SPECIFIC GRAVITY UA: 1.01
TOTAL PROTEIN: 8.2 G/DL (ref 6.4–8.2)
TROPONIN: 0.06 NG/ML
URINE REFLEX TO CULTURE: YES
URINE TYPE: ABNORMAL
UROBILINOGEN, URINE: 0.2 E.U./DL
WBC # BLD: 15.2 K/UL (ref 4–11)
WBC UA: 110 /HPF (ref 0–5)

## 2019-02-11 PROCEDURE — 93010 ELECTROCARDIOGRAM REPORT: CPT | Performed by: INTERNAL MEDICINE

## 2019-02-11 PROCEDURE — 96375 TX/PRO/DX INJ NEW DRUG ADDON: CPT

## 2019-02-11 PROCEDURE — 93005 ELECTROCARDIOGRAM TRACING: CPT | Performed by: PHYSICIAN ASSISTANT

## 2019-02-11 PROCEDURE — 87340 HEPATITIS B SURFACE AG IA: CPT

## 2019-02-11 PROCEDURE — 84484 ASSAY OF TROPONIN QUANT: CPT

## 2019-02-11 PROCEDURE — 36415 COLL VENOUS BLD VENIPUNCTURE: CPT

## 2019-02-11 PROCEDURE — 99285 EMERGENCY DEPT VISIT HI MDM: CPT

## 2019-02-11 PROCEDURE — 6360000002 HC RX W HCPCS: Performed by: INTERNAL MEDICINE

## 2019-02-11 PROCEDURE — 84132 ASSAY OF SERUM POTASSIUM: CPT

## 2019-02-11 PROCEDURE — 2500000003 HC RX 250 WO HCPCS: Performed by: PHYSICIAN ASSISTANT

## 2019-02-11 PROCEDURE — 5A1D70Z PERFORMANCE OF URINARY FILTRATION, INTERMITTENT, LESS THAN 6 HOURS PER DAY: ICD-10-PCS | Performed by: INTERNAL MEDICINE

## 2019-02-11 PROCEDURE — 6370000000 HC RX 637 (ALT 250 FOR IP): Performed by: EMERGENCY MEDICINE

## 2019-02-11 PROCEDURE — 1200000000 HC SEMI PRIVATE

## 2019-02-11 PROCEDURE — 86706 HEP B SURFACE ANTIBODY: CPT

## 2019-02-11 PROCEDURE — 90937 HEMODIALYSIS REPEATED EVAL: CPT

## 2019-02-11 PROCEDURE — 81001 URINALYSIS AUTO W/SCOPE: CPT

## 2019-02-11 PROCEDURE — 85025 COMPLETE CBC W/AUTO DIFF WBC: CPT

## 2019-02-11 PROCEDURE — 80053 COMPREHEN METABOLIC PANEL: CPT

## 2019-02-11 PROCEDURE — 83036 HEMOGLOBIN GLYCOSYLATED A1C: CPT

## 2019-02-11 PROCEDURE — 6360000002 HC RX W HCPCS: Performed by: PHYSICIAN ASSISTANT

## 2019-02-11 PROCEDURE — 96365 THER/PROPH/DIAG IV INF INIT: CPT

## 2019-02-11 PROCEDURE — 6360000002 HC RX W HCPCS: Performed by: EMERGENCY MEDICINE

## 2019-02-11 PROCEDURE — 71045 X-RAY EXAM CHEST 1 VIEW: CPT

## 2019-02-11 PROCEDURE — 84100 ASSAY OF PHOSPHORUS: CPT

## 2019-02-11 PROCEDURE — 70450 CT HEAD/BRAIN W/O DYE: CPT

## 2019-02-11 PROCEDURE — 2580000003 HC RX 258: Performed by: EMERGENCY MEDICINE

## 2019-02-11 PROCEDURE — 87086 URINE CULTURE/COLONY COUNT: CPT

## 2019-02-11 PROCEDURE — 86704 HEP B CORE ANTIBODY TOTAL: CPT

## 2019-02-11 RX ORDER — METHOCARBAMOL 500 MG/1
500 TABLET, FILM COATED ORAL EVERY 12 HOURS PRN
COMMUNITY

## 2019-02-11 RX ORDER — BENZONATATE 100 MG/1
100 CAPSULE ORAL 3 TIMES DAILY PRN
COMMUNITY

## 2019-02-11 RX ORDER — LOPERAMIDE HYDROCHLORIDE 2 MG/1
2 CAPSULE ORAL EVERY 6 HOURS PRN
COMMUNITY

## 2019-02-11 RX ORDER — ONDANSETRON 2 MG/ML
4 INJECTION INTRAMUSCULAR; INTRAVENOUS EVERY 6 HOURS PRN
Status: DISCONTINUED | OUTPATIENT
Start: 2019-02-11 | End: 2019-02-14 | Stop reason: HOSPADM

## 2019-02-11 RX ORDER — DEXTROSE MONOHYDRATE 25 G/50ML
50 INJECTION, SOLUTION INTRAVENOUS ONCE
Status: COMPLETED | OUTPATIENT
Start: 2019-02-11 | End: 2019-02-11

## 2019-02-11 RX ORDER — SENNA PLUS 8.6 MG/1
2 TABLET ORAL NIGHTLY
COMMUNITY

## 2019-02-11 RX ADMIN — DEXTROSE MONOHYDRATE 50 ML: 25 INJECTION, SOLUTION INTRAVENOUS at 15:27

## 2019-02-11 RX ADMIN — INSULIN HUMAN 9 UNITS: 100 INJECTION, SOLUTION PARENTERAL at 15:30

## 2019-02-11 RX ADMIN — ONDANSETRON 4 MG: 2 INJECTION INTRAMUSCULAR; INTRAVENOUS at 22:42

## 2019-02-11 RX ADMIN — CEFTRIAXONE SODIUM 1 G: 10 INJECTION, POWDER, FOR SOLUTION INTRAVENOUS at 14:36

## 2019-02-11 RX ADMIN — CALCIUM GLUCONATE 1 G: 98 INJECTION, SOLUTION INTRAVENOUS at 15:31

## 2019-02-11 ASSESSMENT — ENCOUNTER SYMPTOMS
SHORTNESS OF BREATH: 0
CHEST TIGHTNESS: 0
BACK PAIN: 1
SINUS PAIN: 0
ABDOMINAL PAIN: 0
NAUSEA: 0
RHINORRHEA: 0
DIARRHEA: 0
CONSTIPATION: 0
COUGH: 1
WHEEZING: 0
SORE THROAT: 0
VOMITING: 0

## 2019-02-12 PROBLEM — R47.01 APHASIA: Status: RESOLVED | Noted: 2018-09-19 | Resolved: 2019-02-12

## 2019-02-12 PROBLEM — G93.41 ACUTE METABOLIC ENCEPHALOPATHY: Status: RESOLVED | Noted: 2018-10-03 | Resolved: 2019-02-12

## 2019-02-12 PROBLEM — R41.82 ALTERED MENTAL STATUS: Status: ACTIVE | Noted: 2019-02-12

## 2019-02-12 PROBLEM — I69.30 LATE EFFECTS OF CEREBRAL ISCHEMIC STROKE: Status: ACTIVE | Noted: 2019-02-12

## 2019-02-12 LAB
ESTIMATED AVERAGE GLUCOSE: 131.2 MG/DL
GLUCOSE BLD-MCNC: 108 MG/DL (ref 70–99)
GLUCOSE BLD-MCNC: 130 MG/DL (ref 70–99)
GLUCOSE BLD-MCNC: 151 MG/DL (ref 70–99)
GLUCOSE BLD-MCNC: 165 MG/DL (ref 70–99)
GLUCOSE BLD-MCNC: 87 MG/DL (ref 70–99)
GLUCOSE BLD-MCNC: 98 MG/DL (ref 70–99)
HBA1C MFR BLD: 6.2 %
HBV SURFACE AB TITR SER: 41.87 MIU/ML
HEPATITIS B SURFACE ANTIGEN INTERPRETATION: NORMAL
PERFORMED ON: ABNORMAL
PERFORMED ON: NORMAL
PERFORMED ON: NORMAL
URINE CULTURE, ROUTINE: NORMAL

## 2019-02-12 PROCEDURE — 6370000000 HC RX 637 (ALT 250 FOR IP): Performed by: INTERNAL MEDICINE

## 2019-02-12 PROCEDURE — 2500000003 HC RX 250 WO HCPCS: Performed by: INTERNAL MEDICINE

## 2019-02-12 PROCEDURE — 94664 DEMO&/EVAL PT USE INHALER: CPT

## 2019-02-12 PROCEDURE — 2580000003 HC RX 258: Performed by: INTERNAL MEDICINE

## 2019-02-12 PROCEDURE — 94640 AIRWAY INHALATION TREATMENT: CPT

## 2019-02-12 PROCEDURE — 6360000002 HC RX W HCPCS: Performed by: INTERNAL MEDICINE

## 2019-02-12 PROCEDURE — 94760 N-INVAS EAR/PLS OXIMETRY 1: CPT

## 2019-02-12 PROCEDURE — 1200000000 HC SEMI PRIVATE

## 2019-02-12 RX ORDER — LIDOCAINE AND PRILOCAINE 25; 25 MG/G; MG/G
CREAM TOPICAL PRN
Status: DISCONTINUED | OUTPATIENT
Start: 2019-02-12 | End: 2019-02-14 | Stop reason: HOSPADM

## 2019-02-12 RX ORDER — ACETAMINOPHEN 325 MG/1
650 TABLET ORAL EVERY 6 HOURS PRN
Status: DISCONTINUED | OUTPATIENT
Start: 2019-02-12 | End: 2019-02-14 | Stop reason: HOSPADM

## 2019-02-12 RX ORDER — CHOLECALCIFEROL (VITAMIN D3) 10 MCG
1 TABLET ORAL
Status: DISCONTINUED | OUTPATIENT
Start: 2019-02-12 | End: 2019-02-14 | Stop reason: HOSPADM

## 2019-02-12 RX ORDER — LOSARTAN POTASSIUM 100 MG/1
100 TABLET ORAL NIGHTLY
Status: DISCONTINUED | OUTPATIENT
Start: 2019-02-12 | End: 2019-02-14 | Stop reason: HOSPADM

## 2019-02-12 RX ORDER — BROMPHENIRAMINE MALEATE, PSEUDOEPHEDRINE HYDROCHLORIDE, AND DEXTROMETHORPHAN HYDROBROMIDE 2; 30; 10 MG/5ML; MG/5ML; MG/5ML
7.5 SYRUP ORAL EVERY 4 HOURS PRN
Status: DISCONTINUED | OUTPATIENT
Start: 2019-02-12 | End: 2019-02-12 | Stop reason: CLARIF

## 2019-02-12 RX ORDER — DEXTROSE MONOHYDRATE 50 MG/ML
100 INJECTION, SOLUTION INTRAVENOUS PRN
Status: DISCONTINUED | OUTPATIENT
Start: 2019-02-12 | End: 2019-02-14 | Stop reason: HOSPADM

## 2019-02-12 RX ORDER — SODIUM CHLORIDE 9 MG/ML
INJECTION, SOLUTION INTRAVENOUS CONTINUOUS
Status: DISCONTINUED | OUTPATIENT
Start: 2019-02-12 | End: 2019-02-12

## 2019-02-12 RX ORDER — MONTELUKAST SODIUM 10 MG/1
10 TABLET ORAL DAILY
Status: DISCONTINUED | OUTPATIENT
Start: 2019-02-12 | End: 2019-02-14 | Stop reason: HOSPADM

## 2019-02-12 RX ORDER — GUAIFENESIN/DEXTROMETHORPHAN 100-10MG/5
5 SYRUP ORAL EVERY 4 HOURS PRN
Status: DISCONTINUED | OUTPATIENT
Start: 2019-02-12 | End: 2019-02-14 | Stop reason: HOSPADM

## 2019-02-12 RX ORDER — ASPIRIN 81 MG/1
81 TABLET, CHEWABLE ORAL DAILY
Status: DISCONTINUED | OUTPATIENT
Start: 2019-02-12 | End: 2019-02-14 | Stop reason: HOSPADM

## 2019-02-12 RX ORDER — PANTOPRAZOLE SODIUM 40 MG/1
40 TABLET, DELAYED RELEASE ORAL
Status: DISCONTINUED | OUTPATIENT
Start: 2019-02-12 | End: 2019-02-14 | Stop reason: HOSPADM

## 2019-02-12 RX ORDER — METHOCARBAMOL 500 MG/1
500 TABLET, FILM COATED ORAL 3 TIMES DAILY
Status: DISCONTINUED | OUTPATIENT
Start: 2019-02-12 | End: 2019-02-12

## 2019-02-12 RX ORDER — CALCIUM CARBONATE 200(500)MG
2 TABLET,CHEWABLE ORAL 3 TIMES DAILY
Status: DISCONTINUED | OUTPATIENT
Start: 2019-02-12 | End: 2019-02-14 | Stop reason: HOSPADM

## 2019-02-12 RX ORDER — SENNA PLUS 8.6 MG/1
2 TABLET ORAL NIGHTLY
Status: DISCONTINUED | OUTPATIENT
Start: 2019-02-12 | End: 2019-02-14 | Stop reason: HOSPADM

## 2019-02-12 RX ORDER — FLUTICASONE PROPIONATE 50 MCG
2 SPRAY, SUSPENSION (ML) NASAL DAILY
Status: DISCONTINUED | OUTPATIENT
Start: 2019-02-12 | End: 2019-02-14 | Stop reason: HOSPADM

## 2019-02-12 RX ORDER — METHOCARBAMOL 500 MG/1
500 TABLET, FILM COATED ORAL 2 TIMES DAILY PRN
Status: DISCONTINUED | OUTPATIENT
Start: 2019-02-12 | End: 2019-02-14 | Stop reason: HOSPADM

## 2019-02-12 RX ORDER — IPRATROPIUM BROMIDE AND ALBUTEROL SULFATE 2.5; .5 MG/3ML; MG/3ML
1 SOLUTION RESPIRATORY (INHALATION) EVERY 4 HOURS PRN
Status: DISCONTINUED | OUTPATIENT
Start: 2019-02-12 | End: 2019-02-14 | Stop reason: HOSPADM

## 2019-02-12 RX ORDER — NICOTINE POLACRILEX 4 MG
15 LOZENGE BUCCAL PRN
Status: DISCONTINUED | OUTPATIENT
Start: 2019-02-12 | End: 2019-02-14 | Stop reason: HOSPADM

## 2019-02-12 RX ORDER — DEXTROSE MONOHYDRATE 25 G/50ML
12.5 INJECTION, SOLUTION INTRAVENOUS PRN
Status: DISCONTINUED | OUTPATIENT
Start: 2019-02-12 | End: 2019-02-14 | Stop reason: HOSPADM

## 2019-02-12 RX ORDER — ATORVASTATIN CALCIUM 20 MG/1
20 TABLET, FILM COATED ORAL
Status: DISCONTINUED | OUTPATIENT
Start: 2019-02-12 | End: 2019-02-14 | Stop reason: HOSPADM

## 2019-02-12 RX ORDER — CARVEDILOL 6.25 MG/1
6.25 TABLET ORAL 2 TIMES DAILY WITH MEALS
Status: DISCONTINUED | OUTPATIENT
Start: 2019-02-12 | End: 2019-02-14 | Stop reason: HOSPADM

## 2019-02-12 RX ORDER — BENZONATATE 100 MG/1
100 CAPSULE ORAL 3 TIMES DAILY PRN
Status: DISCONTINUED | OUTPATIENT
Start: 2019-02-12 | End: 2019-02-14 | Stop reason: HOSPADM

## 2019-02-12 RX ORDER — MIDODRINE HYDROCHLORIDE 5 MG/1
2.5 TABLET ORAL
Status: DISCONTINUED | OUTPATIENT
Start: 2019-02-12 | End: 2019-02-14 | Stop reason: HOSPADM

## 2019-02-12 RX ORDER — LOPERAMIDE HYDROCHLORIDE 2 MG/1
2 CAPSULE ORAL EVERY 6 HOURS PRN
Status: DISCONTINUED | OUTPATIENT
Start: 2019-02-12 | End: 2019-02-14 | Stop reason: HOSPADM

## 2019-02-12 RX ADMIN — ANTACID TABLETS 1000 MG: 500 TABLET, CHEWABLE ORAL at 20:56

## 2019-02-12 RX ADMIN — INSULIN LISPRO 1 UNITS: 100 INJECTION, SOLUTION INTRAVENOUS; SUBCUTANEOUS at 20:56

## 2019-02-12 RX ADMIN — ASPIRIN 81 MG 81 MG: 81 TABLET ORAL at 08:45

## 2019-02-12 RX ADMIN — CARVEDILOL 6.25 MG: 6.25 TABLET, FILM COATED ORAL at 08:45

## 2019-02-12 RX ADMIN — MONTELUKAST SODIUM 10 MG: 10 TABLET, FILM COATED ORAL at 08:45

## 2019-02-12 RX ADMIN — INSULIN LISPRO 1 UNITS: 100 INJECTION, SOLUTION INTRAVENOUS; SUBCUTANEOUS at 19:04

## 2019-02-12 RX ADMIN — GUAIFENESIN AND DEXTROMETHORPHAN 5 ML: 100; 10 SYRUP ORAL at 20:14

## 2019-02-12 RX ADMIN — FLUTICASONE PROPIONATE 2 SPRAY: 50 SPRAY, METERED NASAL at 08:47

## 2019-02-12 RX ADMIN — CEFTRIAXONE SODIUM 1 G: 10 INJECTION, POWDER, FOR SOLUTION INTRAVENOUS at 17:48

## 2019-02-12 RX ADMIN — LOSARTAN POTASSIUM 100 MG: 100 TABLET, FILM COATED ORAL at 20:56

## 2019-02-12 RX ADMIN — PANTOPRAZOLE SODIUM 40 MG: 40 TABLET, DELAYED RELEASE ORAL at 06:56

## 2019-02-12 RX ADMIN — ATORVASTATIN CALCIUM 20 MG: 20 TABLET, FILM COATED ORAL at 06:55

## 2019-02-12 RX ADMIN — Medication 2 PUFF: at 21:51

## 2019-02-12 RX ADMIN — Medication 2 PUFF: at 12:03

## 2019-02-12 RX ADMIN — CARVEDILOL 6.25 MG: 6.25 TABLET, FILM COATED ORAL at 19:04

## 2019-02-12 RX ADMIN — SENNOSIDES 17.2 MG: 8.6 TABLET, FILM COATED ORAL at 20:55

## 2019-02-12 RX ADMIN — ANTACID TABLETS 1000 MG: 500 TABLET, CHEWABLE ORAL at 08:45

## 2019-02-12 RX ADMIN — SODIUM CHLORIDE: 9 INJECTION, SOLUTION INTRAVENOUS at 06:56

## 2019-02-12 RX ADMIN — ANTACID TABLETS 1000 MG: 500 TABLET, CHEWABLE ORAL at 13:49

## 2019-02-12 RX ADMIN — CHOLECALCIFEROL TAB 125 MCG (5000 UNIT) 5000 UNITS: 125 TAB at 08:45

## 2019-02-12 RX ADMIN — NEPHROCAP 1 MG: 1 CAP ORAL at 06:56

## 2019-02-12 RX ADMIN — METHOCARBAMOL TABLETS 500 MG: 500 TABLET, COATED ORAL at 08:45

## 2019-02-12 ASSESSMENT — PAIN SCALES - GENERAL
PAINLEVEL_OUTOF10: 0

## 2019-02-13 LAB
ALBUMIN SERPL-MCNC: 3.3 G/DL (ref 3.4–5)
ANION GAP SERPL CALCULATED.3IONS-SCNC: 17 MMOL/L (ref 3–16)
BUN BLDV-MCNC: 38 MG/DL (ref 7–20)
CALCIUM SERPL-MCNC: 8.6 MG/DL (ref 8.3–10.6)
CHLORIDE BLD-SCNC: 97 MMOL/L (ref 99–110)
CO2: 23 MMOL/L (ref 21–32)
CREAT SERPL-MCNC: 7.3 MG/DL (ref 0.6–1.2)
GFR AFRICAN AMERICAN: 7
GFR NON-AFRICAN AMERICAN: 5
GLUCOSE BLD-MCNC: 118 MG/DL (ref 70–99)
GLUCOSE BLD-MCNC: 227 MG/DL (ref 70–99)
GLUCOSE BLD-MCNC: 241 MG/DL (ref 70–99)
GLUCOSE BLD-MCNC: 82 MG/DL (ref 70–99)
HCT VFR BLD CALC: 29.1 % (ref 36–48)
HEMOGLOBIN: 9.2 G/DL (ref 12–16)
MCH RBC QN AUTO: 27.2 PG (ref 26–34)
MCHC RBC AUTO-ENTMCNC: 31.7 G/DL (ref 31–36)
MCV RBC AUTO: 85.7 FL (ref 80–100)
PDW BLD-RTO: 18.2 % (ref 12.4–15.4)
PERFORMED ON: ABNORMAL
PERFORMED ON: ABNORMAL
PERFORMED ON: NORMAL
PHOSPHORUS: 5.1 MG/DL (ref 2.5–4.9)
PLATELET # BLD: 171 K/UL (ref 135–450)
PMV BLD AUTO: 7.5 FL (ref 5–10.5)
POTASSIUM SERPL-SCNC: 4.2 MMOL/L (ref 3.5–5.1)
RBC # BLD: 3.4 M/UL (ref 4–5.2)
SODIUM BLD-SCNC: 137 MMOL/L (ref 136–145)
WBC # BLD: 5.4 K/UL (ref 4–11)

## 2019-02-13 PROCEDURE — 6360000002 HC RX W HCPCS: Performed by: INTERNAL MEDICINE

## 2019-02-13 PROCEDURE — 5A1D70Z PERFORMANCE OF URINARY FILTRATION, INTERMITTENT, LESS THAN 6 HOURS PER DAY: ICD-10-PCS | Performed by: INTERNAL MEDICINE

## 2019-02-13 PROCEDURE — 94640 AIRWAY INHALATION TREATMENT: CPT

## 2019-02-13 PROCEDURE — 90937 HEMODIALYSIS REPEATED EVAL: CPT

## 2019-02-13 PROCEDURE — 36415 COLL VENOUS BLD VENIPUNCTURE: CPT

## 2019-02-13 PROCEDURE — 2500000003 HC RX 250 WO HCPCS: Performed by: INTERNAL MEDICINE

## 2019-02-13 PROCEDURE — 1200000000 HC SEMI PRIVATE

## 2019-02-13 PROCEDURE — 6370000000 HC RX 637 (ALT 250 FOR IP): Performed by: INTERNAL MEDICINE

## 2019-02-13 PROCEDURE — 80069 RENAL FUNCTION PANEL: CPT

## 2019-02-13 PROCEDURE — 94760 N-INVAS EAR/PLS OXIMETRY 1: CPT

## 2019-02-13 PROCEDURE — 85027 COMPLETE CBC AUTOMATED: CPT

## 2019-02-13 RX ORDER — SODIUM CHLORIDE 9 MG/ML
INJECTION, SOLUTION INTRAVENOUS
Status: DISPENSED
Start: 2019-02-13 | End: 2019-02-13

## 2019-02-13 RX ORDER — SODIUM CHLORIDE 0.9 % (FLUSH) 0.9 %
SYRINGE (ML) INJECTION
Status: DISPENSED
Start: 2019-02-13 | End: 2019-02-14

## 2019-02-13 RX ADMIN — Medication 2 PUFF: at 09:58

## 2019-02-13 RX ADMIN — GUAIFENESIN AND DEXTROMETHORPHAN 5 ML: 100; 10 SYRUP ORAL at 19:24

## 2019-02-13 RX ADMIN — INSULIN LISPRO 2 UNITS: 100 INJECTION, SOLUTION INTRAVENOUS; SUBCUTANEOUS at 21:23

## 2019-02-13 RX ADMIN — LOSARTAN POTASSIUM 100 MG: 100 TABLET, FILM COATED ORAL at 21:23

## 2019-02-13 RX ADMIN — PANTOPRAZOLE SODIUM 40 MG: 40 TABLET, DELAYED RELEASE ORAL at 06:39

## 2019-02-13 RX ADMIN — CARVEDILOL 6.25 MG: 6.25 TABLET, FILM COATED ORAL at 15:27

## 2019-02-13 RX ADMIN — CEFTRIAXONE SODIUM 1 G: 10 INJECTION, POWDER, FOR SOLUTION INTRAVENOUS at 15:27

## 2019-02-13 RX ADMIN — FLUTICASONE PROPIONATE 2 SPRAY: 50 SPRAY, METERED NASAL at 09:22

## 2019-02-13 RX ADMIN — ANTACID TABLETS 1000 MG: 500 TABLET, CHEWABLE ORAL at 21:23

## 2019-02-13 RX ADMIN — Medication 2 PUFF: at 20:43

## 2019-02-13 ASSESSMENT — PAIN SCALES - GENERAL
PAINLEVEL_OUTOF10: 0

## 2019-02-14 VITALS
WEIGHT: 153.8 LBS | TEMPERATURE: 98 F | HEIGHT: 63 IN | HEART RATE: 82 BPM | RESPIRATION RATE: 16 BRPM | OXYGEN SATURATION: 100 % | DIASTOLIC BLOOD PRESSURE: 65 MMHG | SYSTOLIC BLOOD PRESSURE: 144 MMHG | BODY MASS INDEX: 27.25 KG/M2

## 2019-02-14 LAB
GLUCOSE BLD-MCNC: 123 MG/DL (ref 70–99)
HEPATITIS B CORE TOTAL ANTIBODY: NEGATIVE
PERFORMED ON: ABNORMAL

## 2019-02-14 PROCEDURE — 94640 AIRWAY INHALATION TREATMENT: CPT

## 2019-02-14 PROCEDURE — 94760 N-INVAS EAR/PLS OXIMETRY 1: CPT

## 2019-02-14 PROCEDURE — 6370000000 HC RX 637 (ALT 250 FOR IP): Performed by: INTERNAL MEDICINE

## 2019-02-14 RX ORDER — CEFUROXIME AXETIL 250 MG/1
250 TABLET ORAL 2 TIMES DAILY
Qty: 20 TABLET | Refills: 0 | Status: SHIPPED | OUTPATIENT
Start: 2019-02-14 | End: 2019-02-24

## 2019-02-14 RX ORDER — NICOTINE POLACRILEX 4 MG
15 LOZENGE BUCCAL PRN
Qty: 45 G | Refills: 1 | Status: SHIPPED | OUTPATIENT
Start: 2019-02-14

## 2019-02-14 RX ORDER — GUAIFENESIN/DEXTROMETHORPHAN 100-10MG/5
5 SYRUP ORAL EVERY 4 HOURS PRN
Qty: 120 ML | COMMUNITY
Start: 2019-02-14 | End: 2019-02-24

## 2019-02-14 RX ADMIN — FLUTICASONE PROPIONATE 2 SPRAY: 50 SPRAY, METERED NASAL at 10:25

## 2019-02-14 RX ADMIN — Medication 2 PUFF: at 08:09

## 2019-02-14 RX ADMIN — CHOLECALCIFEROL TAB 125 MCG (5000 UNIT) 5000 UNITS: 125 TAB at 10:24

## 2019-02-14 RX ADMIN — MONTELUKAST SODIUM 10 MG: 10 TABLET, FILM COATED ORAL at 10:25

## 2019-02-14 RX ADMIN — PANTOPRAZOLE SODIUM 40 MG: 40 TABLET, DELAYED RELEASE ORAL at 05:15

## 2019-02-14 RX ADMIN — ANTACID TABLETS 1000 MG: 500 TABLET, CHEWABLE ORAL at 10:24

## 2019-02-14 RX ADMIN — GUAIFENESIN AND DEXTROMETHORPHAN 5 ML: 100; 10 SYRUP ORAL at 10:24

## 2019-02-14 RX ADMIN — ASPIRIN 81 MG 81 MG: 81 TABLET ORAL at 10:24

## 2019-02-14 RX ADMIN — CARVEDILOL 6.25 MG: 6.25 TABLET, FILM COATED ORAL at 10:25

## 2019-02-14 ASSESSMENT — PAIN SCALES - GENERAL
PAINLEVEL_OUTOF10: 0
PAINLEVEL_OUTOF10: 0

## 2019-02-15 LAB
GLUCOSE BLD-MCNC: 177 MG/DL (ref 70–99)
PERFORMED ON: ABNORMAL

## 2019-03-01 NOTE — ED NOTES
Nephrologist at bedside     [facility]  10/03/18 9730 Statement Selected

## 2019-03-13 PROBLEM — N39.0 UTI (URINARY TRACT INFECTION): Status: RESOLVED | Noted: 2019-02-11 | Resolved: 2019-03-13

## 2023-04-09 NOTE — PROGRESS NOTES
PRN   ipratropium-albuterol (DUONEB) nebulizer solution 3 mL 4x daily   ipratropium-albuterol (DUONEB) nebulizer solution 1 ampule Q4H PRN       Objective:  BP (!) 172/76   Pulse 80   Temp 98.7 °F (37.1 °C) (Temporal)   Resp 16   Ht 4' 11.84\" (1.52 m)   Wt 154 lb 15.7 oz (70.3 kg)   SpO2 96%   BMI 30.43 kg/m²     Intake/Output Summary (Last 24 hours) at 09/20/18 1229  Last data filed at 09/20/18 0000   Gross per 24 hour   Intake              120 ml   Output              100 ml   Net               20 ml    Wt Readings from Last 3 Encounters:   09/20/18 154 lb 15.7 oz (70.3 kg)   02/12/18 140 lb (63.5 kg)   01/11/17 151 lb 3.8 oz (68.6 kg)       General appearance:   female,Appears comfortable  Eyes: Sclera clear. Pupils equal.  ENT: Moist oral mucosa. Trachea midline, no adenopathy. Cardiovascular: Regular rhythm, normal S1, S2. No murmur. Respiratory: Not using accessory muscles. Good inspiratory effort. Clear to auscultation bilaterally, no wheeze or crackles. GI: Abdomen soft, no tenderness, not distended, normal bowel sounds  Musculoskeletal: No cyanosis in digits, neck supple  Neurology: CN 2-12 grossly intact. No speech or motor deficits  Psych: Normal affect. Alert and oriented in time, place and person  Skin: Warm, dry, normal turgor  Extremities:No edema in lower extremities, left forearm graft intact. Labs and Tests:  CBC:   Recent Labs      09/19/18   1250   WBC  7.2   HGB  12.6   PLT  186     BMP:  Recent Labs      09/19/18   1250   NA  137   K  4.3   CL  91*   CO2  32   BUN  31*   CREATININE  3.7*   GLUCOSE  128*     Hepatic: Recent Labs      09/19/18   1250   AST  24   ALT  17   BILITOT  0.7   ALKPHOS  105         Problem List  Active Problems:    Aphasia  Resolved Problems:    * No resolved hospital problems. *       Assessment & Plan:     1.r/o CVA ,  CT of the head is negative for acute findings,  MRI of the brain pending, Hg A1c, lipid panel,   2. Speech and language therapy,  PT/OT consulted. Neurology consult pending  3. ESRD, Nephrology for Hemodialysis orders . HD tomorrow   4. Remote history of CVA cont asa and lipitor  5. HTN holding antihypertensives  6. Chronic elevated troponin at baseline 2/2 ESRD pt denies chest pain , will continue to monitor  7.  Afib c/w coreg and cardizem           Diet: DIET GENERAL;  Code:Full Code  DVT PPX lovenox       Dipti Pruitt MD   9/20/2018 12:29 PM IV discontinued, cath removed intact